# Patient Record
Sex: FEMALE | Race: WHITE | Employment: UNEMPLOYED | URBAN - NONMETROPOLITAN AREA
[De-identification: names, ages, dates, MRNs, and addresses within clinical notes are randomized per-mention and may not be internally consistent; named-entity substitution may affect disease eponyms.]

---

## 2018-06-25 ENCOUNTER — HOSPITAL ENCOUNTER (OUTPATIENT)
Age: 28
Discharge: HOME | End: 2018-06-25
Payer: COMMERCIAL

## 2018-06-25 LAB — HCG,BETA SUBUNIT,QUAL,SERUM: ABNORMAL MIU/ML (ref 0–5)

## 2018-06-25 PROCEDURE — 84702 CHORIONIC GONADOTROPIN TEST: CPT

## 2018-06-25 PROCEDURE — 36415 COLL VENOUS BLD VENIPUNCTURE: CPT

## 2018-06-27 ENCOUNTER — HOSPITAL ENCOUNTER (OUTPATIENT)
Age: 28
Discharge: HOME | End: 2018-06-27
Payer: COMMERCIAL

## 2018-06-27 LAB — HCG,BETA SUBUNIT,QUAL,SERUM: ABNORMAL MIU/ML (ref 0–5)

## 2018-06-27 PROCEDURE — 36415 COLL VENOUS BLD VENIPUNCTURE: CPT

## 2018-06-27 PROCEDURE — 84702 CHORIONIC GONADOTROPIN TEST: CPT

## 2018-07-02 ENCOUNTER — HOSPITAL ENCOUNTER (OUTPATIENT)
Age: 28
Discharge: HOME | End: 2018-07-02
Payer: COMMERCIAL

## 2018-07-02 LAB
BASOPHILS # BLD: 0.3 %
BASOPHILS ABSOLUTE: 0 THOU/MM3 (ref 0–0.1)
EOSINOPHIL # BLD: 1 %
EOSINOPHILS ABSOLUTE: 0.1 THOU/MM3 (ref 0–0.4)
ERYTHROCYTE [DISTWIDTH] IN BLOOD BY AUTOMATED COUNT: 11.9 % (ref 11.5–14.5)
ERYTHROCYTE [DISTWIDTH] IN BLOOD BY AUTOMATED COUNT: 38.5 FL (ref 35–45)
HCT VFR BLD CALC: 36.5 % (ref 37–47)
HEMOGLOBIN: 12.4 GM/DL (ref 12–16)
IMMATURE GRANS (ABS): 0.02 THOU/MM3 (ref 0–0.07)
IMMATURE GRANULOCYTES: 0.3 %
LYMPHOCYTES # BLD: 37.6 %
LYMPHOCYTES ABSOLUTE: 2.3 THOU/MM3 (ref 1–4.8)
MCH RBC QN AUTO: 30.6 PG (ref 26–33)
MCHC RBC AUTO-ENTMCNC: 34 GM/DL (ref 32.2–35.5)
MCV RBC AUTO: 90.1 FL (ref 81–99)
MONOCYTES # BLD: 8.7 %
MONOCYTES ABSOLUTE: 0.5 THOU/MM3 (ref 0.4–1.3)
NUCLEATED RED BLOOD CELLS: 0 /100 WBC
PLATELET # BLD: 256 THOU/MM3 (ref 130–400)
PMV BLD AUTO: 9.2 FL (ref 9.4–12.4)
RBC # BLD: 4.05 MILL/MM3 (ref 4.2–5.4)
SEG NEUTROPHILS: 52.1 %
SEGMENTED NEUTROPHILS ABSOLUTE COUNT: 3.2 THOU/MM3 (ref 1.8–7.7)
WBC # BLD: 6.1 THOU/MM3 (ref 4.8–10.8)

## 2018-07-02 PROCEDURE — 86900 BLOOD TYPING SEROLOGIC ABO: CPT

## 2018-07-02 PROCEDURE — 85025 COMPLETE CBC W/AUTO DIFF WBC: CPT

## 2018-07-02 PROCEDURE — 36415 COLL VENOUS BLD VENIPUNCTURE: CPT

## 2018-07-02 PROCEDURE — 86901 BLOOD TYPING SEROLOGIC RH(D): CPT

## 2018-07-02 PROCEDURE — 86850 RBC ANTIBODY SCREEN: CPT

## 2018-07-03 ENCOUNTER — HOSPITAL ENCOUNTER (OUTPATIENT)
Age: 28
Setting detail: OUTPATIENT SURGERY
Discharge: HOME | End: 2018-07-03
Attending: OBSTETRICS & GYNECOLOGY | Admitting: OBSTETRICS & GYNECOLOGY
Payer: COMMERCIAL

## 2018-07-03 ENCOUNTER — ANESTHESIA (OUTPATIENT)
Dept: OPERATING ROOM | Age: 28
End: 2018-07-03
Payer: COMMERCIAL

## 2018-07-03 ENCOUNTER — ANESTHESIA EVENT (OUTPATIENT)
Dept: OPERATING ROOM | Age: 28
End: 2018-07-03
Payer: COMMERCIAL

## 2018-07-03 VITALS
DIASTOLIC BLOOD PRESSURE: 64 MMHG | TEMPERATURE: 97 F | WEIGHT: 168 LBS | OXYGEN SATURATION: 99 % | BODY MASS INDEX: 24.05 KG/M2 | RESPIRATION RATE: 15 BRPM | HEART RATE: 68 BPM | HEIGHT: 70 IN | SYSTOLIC BLOOD PRESSURE: 105 MMHG

## 2018-07-03 VITALS
DIASTOLIC BLOOD PRESSURE: 50 MMHG | TEMPERATURE: 98.2 F | OXYGEN SATURATION: 99 % | RESPIRATION RATE: 5 BRPM | SYSTOLIC BLOOD PRESSURE: 89 MMHG

## 2018-07-03 DIAGNOSIS — Z98.890 S/P D&C (STATUS POST DILATION AND CURETTAGE): Primary | ICD-10-CM

## 2018-07-03 LAB
ABO: NORMAL
ANTIBODY SCREEN: NORMAL
RH FACTOR: NORMAL

## 2018-07-03 PROCEDURE — 7100000000 HC PACU RECOVERY - FIRST 15 MIN: Performed by: OBSTETRICS & GYNECOLOGY

## 2018-07-03 PROCEDURE — 7100000001 HC PACU RECOVERY - ADDTL 15 MIN: Performed by: OBSTETRICS & GYNECOLOGY

## 2018-07-03 PROCEDURE — 6370000000 HC RX 637 (ALT 250 FOR IP): Performed by: OBSTETRICS & GYNECOLOGY

## 2018-07-03 PROCEDURE — 6360000002 HC RX W HCPCS: Performed by: ANESTHESIOLOGY

## 2018-07-03 PROCEDURE — 6370000000 HC RX 637 (ALT 250 FOR IP)

## 2018-07-03 PROCEDURE — 2500000003 HC RX 250 WO HCPCS: Performed by: OBSTETRICS & GYNECOLOGY

## 2018-07-03 PROCEDURE — 3600000002 HC SURGERY LEVEL 2 BASE: Performed by: OBSTETRICS & GYNECOLOGY

## 2018-07-03 PROCEDURE — 88305 TISSUE EXAM BY PATHOLOGIST: CPT

## 2018-07-03 PROCEDURE — 2580000003 HC RX 258: Performed by: OBSTETRICS & GYNECOLOGY

## 2018-07-03 PROCEDURE — 3700000001 HC ADD 15 MINUTES (ANESTHESIA): Performed by: OBSTETRICS & GYNECOLOGY

## 2018-07-03 PROCEDURE — 2500000003 HC RX 250 WO HCPCS: Performed by: NURSE ANESTHETIST, CERTIFIED REGISTERED

## 2018-07-03 PROCEDURE — 3700000000 HC ANESTHESIA ATTENDED CARE: Performed by: OBSTETRICS & GYNECOLOGY

## 2018-07-03 PROCEDURE — 3600000012 HC SURGERY LEVEL 2 ADDTL 15MIN: Performed by: OBSTETRICS & GYNECOLOGY

## 2018-07-03 PROCEDURE — 6360000002 HC RX W HCPCS: Performed by: NURSE ANESTHETIST, CERTIFIED REGISTERED

## 2018-07-03 PROCEDURE — 7100000010 HC PHASE II RECOVERY - FIRST 15 MIN: Performed by: OBSTETRICS & GYNECOLOGY

## 2018-07-03 PROCEDURE — 7100000011 HC PHASE II RECOVERY - ADDTL 15 MIN: Performed by: OBSTETRICS & GYNECOLOGY

## 2018-07-03 PROCEDURE — 2580000003 HC RX 258: Performed by: NURSE ANESTHETIST, CERTIFIED REGISTERED

## 2018-07-03 RX ORDER — METHYLERGONOVINE MALEATE 0.2 MG/ML
INJECTION INTRAVENOUS PRN
Status: DISCONTINUED | OUTPATIENT
Start: 2018-07-03 | End: 2018-07-03 | Stop reason: SDUPTHER

## 2018-07-03 RX ORDER — SODIUM CHLORIDE 9 MG/ML
INJECTION, SOLUTION INTRAVENOUS CONTINUOUS PRN
Status: DISCONTINUED | OUTPATIENT
Start: 2018-07-03 | End: 2018-07-03 | Stop reason: SDUPTHER

## 2018-07-03 RX ORDER — SODIUM CHLORIDE 9 MG/ML
INJECTION, SOLUTION INTRAVENOUS CONTINUOUS
Status: DISCONTINUED | OUTPATIENT
Start: 2018-07-03 | End: 2018-07-03 | Stop reason: HOSPADM

## 2018-07-03 RX ORDER — FENTANYL CITRATE 50 UG/ML
INJECTION, SOLUTION INTRAMUSCULAR; INTRAVENOUS PRN
Status: DISCONTINUED | OUTPATIENT
Start: 2018-07-03 | End: 2018-07-03 | Stop reason: SDUPTHER

## 2018-07-03 RX ORDER — DEXAMETHASONE SODIUM PHOSPHATE 4 MG/ML
INJECTION, SOLUTION INTRA-ARTICULAR; INTRALESIONAL; INTRAMUSCULAR; INTRAVENOUS; SOFT TISSUE PRN
Status: DISCONTINUED | OUTPATIENT
Start: 2018-07-03 | End: 2018-07-03 | Stop reason: SDUPTHER

## 2018-07-03 RX ORDER — KETOROLAC TROMETHAMINE 30 MG/ML
INJECTION, SOLUTION INTRAMUSCULAR; INTRAVENOUS PRN
Status: DISCONTINUED | OUTPATIENT
Start: 2018-07-03 | End: 2018-07-03

## 2018-07-03 RX ORDER — HYDROCODONE BITARTRATE AND ACETAMINOPHEN 5; 325 MG/1; MG/1
1 TABLET ORAL ONCE
Status: COMPLETED | OUTPATIENT
Start: 2018-07-03 | End: 2018-07-03

## 2018-07-03 RX ORDER — ONDANSETRON 2 MG/ML
INJECTION INTRAMUSCULAR; INTRAVENOUS PRN
Status: DISCONTINUED | OUTPATIENT
Start: 2018-07-03 | End: 2018-07-03 | Stop reason: SDUPTHER

## 2018-07-03 RX ORDER — FENTANYL CITRATE 50 UG/ML
50 INJECTION, SOLUTION INTRAMUSCULAR; INTRAVENOUS EVERY 5 MIN PRN
Status: DISCONTINUED | OUTPATIENT
Start: 2018-07-03 | End: 2018-07-03 | Stop reason: HOSPADM

## 2018-07-03 RX ORDER — HYDROCODONE BITARTRATE AND ACETAMINOPHEN 5; 325 MG/1; MG/1
TABLET ORAL
Status: DISCONTINUED
Start: 2018-07-03 | End: 2018-07-03 | Stop reason: HOSPADM

## 2018-07-03 RX ORDER — SODIUM CHLORIDE 0.9 % (FLUSH) 0.9 %
10 SYRINGE (ML) INJECTION EVERY 12 HOURS SCHEDULED
Status: DISCONTINUED | OUTPATIENT
Start: 2018-07-03 | End: 2018-07-03 | Stop reason: HOSPADM

## 2018-07-03 RX ORDER — SCOLOPAMINE TRANSDERMAL SYSTEM 1 MG/1
1 PATCH, EXTENDED RELEASE TRANSDERMAL ONCE
Status: DISCONTINUED | OUTPATIENT
Start: 2018-07-03 | End: 2018-07-03 | Stop reason: HOSPADM

## 2018-07-03 RX ORDER — MORPHINE SULFATE 2 MG/ML
2 INJECTION, SOLUTION INTRAMUSCULAR; INTRAVENOUS EVERY 5 MIN PRN
Status: DISCONTINUED | OUTPATIENT
Start: 2018-07-03 | End: 2018-07-03 | Stop reason: HOSPADM

## 2018-07-03 RX ORDER — LIDOCAINE HYDROCHLORIDE 20 MG/ML
INJECTION, SOLUTION INFILTRATION; PERINEURAL PRN
Status: DISCONTINUED | OUTPATIENT
Start: 2018-07-03 | End: 2018-07-03 | Stop reason: SDUPTHER

## 2018-07-03 RX ORDER — HYDROCODONE BITARTRATE AND ACETAMINOPHEN 5; 325 MG/1; MG/1
1 TABLET ORAL EVERY 6 HOURS PRN
Qty: 10 TABLET | Refills: 0 | Status: SHIPPED | OUTPATIENT
Start: 2018-07-03 | End: 2018-07-06

## 2018-07-03 RX ORDER — SCOLOPAMINE TRANSDERMAL SYSTEM 1 MG/1
PATCH, EXTENDED RELEASE TRANSDERMAL
Status: DISCONTINUED
Start: 2018-07-03 | End: 2018-07-03 | Stop reason: HOSPADM

## 2018-07-03 RX ORDER — MISOPROSTOL 200 UG/1
TABLET ORAL PRN
Status: DISCONTINUED | OUTPATIENT
Start: 2018-07-03 | End: 2018-07-03 | Stop reason: HOSPADM

## 2018-07-03 RX ORDER — PROPOFOL 10 MG/ML
INJECTION, EMULSION INTRAVENOUS PRN
Status: DISCONTINUED | OUTPATIENT
Start: 2018-07-03 | End: 2018-07-03 | Stop reason: SDUPTHER

## 2018-07-03 RX ORDER — MIDAZOLAM HYDROCHLORIDE 1 MG/ML
INJECTION INTRAMUSCULAR; INTRAVENOUS PRN
Status: DISCONTINUED | OUTPATIENT
Start: 2018-07-03 | End: 2018-07-03 | Stop reason: SDUPTHER

## 2018-07-03 RX ORDER — SODIUM CHLORIDE 0.9 % (FLUSH) 0.9 %
10 SYRINGE (ML) INJECTION PRN
Status: DISCONTINUED | OUTPATIENT
Start: 2018-07-03 | End: 2018-07-03 | Stop reason: HOSPADM

## 2018-07-03 RX ADMIN — SODIUM CHLORIDE: 9 INJECTION, SOLUTION INTRAVENOUS at 12:13

## 2018-07-03 RX ADMIN — FENTANYL CITRATE 50 MCG: 50 INJECTION INTRAMUSCULAR; INTRAVENOUS at 12:22

## 2018-07-03 RX ADMIN — DEXAMETHASONE SODIUM PHOSPHATE 8 MG: 4 INJECTION, SOLUTION INTRAMUSCULAR; INTRAVENOUS at 12:21

## 2018-07-03 RX ADMIN — ONDANSETRON HYDROCHLORIDE 4 MG: 4 INJECTION, SOLUTION INTRAMUSCULAR; INTRAVENOUS at 12:21

## 2018-07-03 RX ADMIN — METHYLERGONOVINE MALEATE 200 MCG: 0.2 INJECTION, SOLUTION INTRAMUSCULAR; INTRAVENOUS at 12:37

## 2018-07-03 RX ADMIN — HYDROCODONE BITARTRATE AND ACETAMINOPHEN 1 TABLET: 5; 325 TABLET ORAL at 14:06

## 2018-07-03 RX ADMIN — FENTANYL CITRATE 25 MCG: 50 INJECTION INTRAMUSCULAR; INTRAVENOUS at 12:44

## 2018-07-03 RX ADMIN — FENTANYL CITRATE 25 MCG: 50 INJECTION INTRAMUSCULAR; INTRAVENOUS at 12:27

## 2018-07-03 RX ADMIN — PROPOFOL 150 MG: 10 INJECTION, EMULSION INTRAVENOUS at 12:17

## 2018-07-03 RX ADMIN — PROPOFOL 50 MG: 10 INJECTION, EMULSION INTRAVENOUS at 12:21

## 2018-07-03 RX ADMIN — MIDAZOLAM HYDROCHLORIDE 2 MG: 1 INJECTION, SOLUTION INTRAMUSCULAR; INTRAVENOUS at 12:15

## 2018-07-03 RX ADMIN — DOXYCYCLINE 100 MG: 100 INJECTION, POWDER, LYOPHILIZED, FOR SOLUTION INTRAVENOUS at 11:43

## 2018-07-03 RX ADMIN — FENTANYL CITRATE 50 MCG: 50 INJECTION, SOLUTION INTRAMUSCULAR; INTRAVENOUS at 13:16

## 2018-07-03 RX ADMIN — LIDOCAINE HYDROCHLORIDE 100 MG: 20 INJECTION, SOLUTION INFILTRATION; PERINEURAL at 12:17

## 2018-07-03 ASSESSMENT — PULMONARY FUNCTION TESTS
PIF_VALUE: 5
PIF_VALUE: 0
PIF_VALUE: 15
PIF_VALUE: 4
PIF_VALUE: 3
PIF_VALUE: 3
PIF_VALUE: 4
PIF_VALUE: 3
PIF_VALUE: 0
PIF_VALUE: 3
PIF_VALUE: 8
PIF_VALUE: 3
PIF_VALUE: 0
PIF_VALUE: 10
PIF_VALUE: 4
PIF_VALUE: 5
PIF_VALUE: 3
PIF_VALUE: 3
PIF_VALUE: 2
PIF_VALUE: 2
PIF_VALUE: 4
PIF_VALUE: 2
PIF_VALUE: 3
PIF_VALUE: 0
PIF_VALUE: 5
PIF_VALUE: 3
PIF_VALUE: 5
PIF_VALUE: 17
PIF_VALUE: 3
PIF_VALUE: 0
PIF_VALUE: 4
PIF_VALUE: 5
PIF_VALUE: 0
PIF_VALUE: 2
PIF_VALUE: 3
PIF_VALUE: 10
PIF_VALUE: 3
PIF_VALUE: 6
PIF_VALUE: 7

## 2018-07-03 ASSESSMENT — PAIN SCALES - GENERAL
PAINLEVEL_OUTOF10: 4
PAINLEVEL_OUTOF10: 7

## 2018-07-03 ASSESSMENT — PAIN - FUNCTIONAL ASSESSMENT: PAIN_FUNCTIONAL_ASSESSMENT: 0-10

## 2018-07-03 NOTE — PROGRESS NOTES
1304 Pt to phase 1 recovery per cart. Pt non responsive to verbal or tactile stimulation. Oral airway in place and jaw thrust maintained by CRNA. POx 96% on room air. Resp easy. Color pink. Skin warm and dry. Christa pad in place - no drainage noted. 1308 Pt awakening - oral airway removed. Resp easy POx 97% on room air. 1315 Pt awake. Talking with staff. Denies nausea. C/o \"cramping - 7\" on scale. 1316 Fentanyl 50 mcg IV given for cramping. 1327 Pt fully awake - taking offered ice chips. 1330 Pt states pain \"3-4\". 1337 Pt alert and stable. Pain remain at \"3-4\" on scale. 1340To phase 2 recovery per cart. 1345 Pt taking offered snack. Family at bedside. 1406 Pt denies nausea. States pain \"3-4\" Norco 1 tab given for pain as ordered. 1425 Discharge instructions reviewed with pt and family. All voice understanding. Denies nausea. States pain is minimal.  1430 Pt dressing with mother at bedside. 56 Pt assisted to bathroom. Voided. Christa pad changed. Moderate amt bloody drainage on old pad. Pt cleansed and new pad applied. Pt denies dizziness when up. 1440 Pt discharged per wheelchair to car -  driving. Pt alert and stable.

## 2018-07-03 NOTE — ANESTHESIA POSTPROCEDURE EVALUATION
Department of Anesthesiology  Postprocedure Note    Patient: Lincoln Wade  MRN: [id number]  YOB: 1990  Date of evaluation: 7/3/2018  Time:  1:24 PM     Procedure Summary     Date:  07/03/18 Room / Location:  [facility] OR 01 / [address] [facility]    Anesthesia Start:  0614 Anesthesia Stop:  3687    Procedure:  DILATATION AND CURETTAGE SUCTION (N/A ) Diagnosis:  (BLIGHTED OVUM)    Surgeon:  Samreen Carl MD Responsible Provider:  Sarah Arellano MD    Anesthesia Type:  general ASA Status:  2          Anesthesia Type: general    Mk Phase I:      Mk Phase II:      Last vitals: Reviewed and per EMR flowsheets.        Anesthesia Post Evaluation    Patient location during evaluation: PACU  Patient participation: complete - patient participated  Level of consciousness: awake  Airway patency: patent  Nausea & Vomiting: no vomiting and no nausea  Complications: no  Cardiovascular status: hemodynamically stable  Respiratory status: acceptable  Hydration status: stable

## 2018-07-03 NOTE — DISCHARGE SUMMARY
GYN Surgery  Discharge Summary     Patient ID:  Adrian Meckel  [id number]  28 y.o.  1990    Admit date: 7/3/2018    Admitting Physician: Brent Marquez    Discharge Diagnoses: BLIGHTED OVUM    Discharged Condition: good      Procedures Performed: [facility]     Hospital Course: Patient was admitted on the day of surgery, and underwent an uncomplicated procedure. See dictated op note. Disposition: home    Patient Instructions: Activity: activity as tolerated, no sex for 2 weeks and no driving while on analgesics  Diet: regular  Wound Care: as directed    Discharge Medication:    oYli Hamilton   Home Medication Instructions VTR:[id number]    Printed on:07/03/18 8778   Medication Information                      HYDROcodone-acetaminophen (NORCO) 5-325 MG per tablet  Take 1 tablet by mouth every 6 hours as needed for Pain for up to 3 days. Intended supply: 3 days. Take lowest dose possible to manage pain.              Prenatal MV-Min-Fe Fum-FA-DHA (PRENATAL 1 PO)  Take 1 tablet by mouth                  Discharge Date: 7/3/18    Follow-up with Brent Marquez in 1 week    Signed:  Electronically signed by Brent Marquez MD on 7/3/2018 at 1:02 PM

## 2018-07-03 NOTE — H&P
[facility]  History and Physical Update    Pt Name: Ej Salazar  MRN: [id number]  YOB: 1990  Date of evaluation: 7/3/2018    [x] I have examined the patient and reviewed the H&P/Consult and there are no changes to the patient or plans.     [] I have examined the patient and reviewed the H&P/Consult and have noted the following changes:     33  with blighted ovum for suction D&C       Haze Awilda  Electronically signed 7/3/2018 at 7:16 AM

## 2018-07-03 NOTE — ANESTHESIA PRE PROCEDURE
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 BP Readings from Last 3 Encounters:   12/07/16 122/60   09/18/16 126/83   05/04/16 110/80       NPO Status: Time of last liquid consumption: 2200                        Time of last solid consumption: 2200                        Date of last liquid consumption: 07/02/18                        Date of last solid food consumption: 07/02/18    BMI:   Wt Readings from Last 3 Encounters:   12/07/16 168 lb 12.8 oz (76.6 kg)   09/18/16 159 lb 9.6 oz (72.4 kg)   05/04/16 160 lb (72.6 kg)     There is no height or weight on file to calculate BMI.    CBC:   Lab Results   Component Value Date    WBC 6.1 07/02/2018    RBC 4.05 07/02/2018    HGB 12.4 07/02/2018    HCT 36.5 07/02/2018    MCV 90.1 07/02/2018    RDW 12.5 01/05/2013     07/02/2018       CMP:   Lab Results   Component Value Date     01/05/2013    K 4.3 01/05/2013     01/05/2013    CO2 26 01/05/2013    BUN 10 01/05/2013    CREATININE 0.7 01/05/2013    LABGLOM >90 01/06/2013    GLUCOSE 98 01/05/2013    CALCIUM 9.4 01/05/2013       POC Tests: No results for input(s): POCGLU, POCNA, POCK, POCCL, POCBUN, POCHEMO, POCHCT in the last 72 hours.     Coags: No results found for: PROTIME, INR, APTT    HCG (If Applicable):   Lab Results   Component Value Date    PREGTESTUR NEGATIVE 05/07/2012    PREGSERUM

## 2018-09-06 ENCOUNTER — NURSE TRIAGE (OUTPATIENT)
Dept: ADMINISTRATIVE | Age: 28
End: 2018-09-06

## 2018-09-07 NOTE — TELEPHONE ENCOUNTER
Reason for Disposition   SPOTTING after sexual intercourse (single or brief episode)    Answer Assessment - Initial Assessment Questions  1. SYMPTOM: \"What's the main symptom you're concerned about? \" (e.g., pain, itching, dryness)      Spotting during intercourse. 2. LOCATION: \"Where is the  _______ located? \" (e.g., inside/outside, left/right)    n/a  3. ONSET: \"When did the  ________  start? \"      Tonight. 4. PAIN: \"Is there any pain? \" If so, ask: \"How bad is it? \" (Scale: 1-10; mild, moderate, severe)      No pain   5. ITCHING: \"Is there any itching? \" If so, ask: \"How bad is it? \" (Scale: 1-10; mild, moderate, severe)      No   6. CAUSE: \"What do you think is causing the symptoms? \"      [place]. 7. OTHER SYMPTOMS: \"Do you have any other symptoms? \" (e.g., vaginal bleeding, pain with urination)      No   8. PREGNANCY: \"Is there any chance you are pregnant? \" \"When was your last menstrual period? \"      Pregnant and not has seen OB GYN yet. 7. CAUSE: \"What do you think is causing the discharge? \" \"Have you had the same problem before? What happened then? \"      No.   8. OTHER SYMPTOMS: \"Do you have any other symptoms? \" (e.g., fever, itching, vaginal bleeding, pain with urination, injury to genital area, vaginal foreign body)  No    Answer Assessment - Initial Assessment Questions  1. ONSET: \"When did this bleeding start? \"        During intercourse. 2. DESCRIPTION: \"Describe the bleeding that you are having. \" \"How much bleeding is there? \"     - SPOTTING: spotting, or pinkish / brownish mucous discharge; does not fill panti-liner or pad     - MILD:  less than 1 pad / hour; less than patient's usual menstrual bleeding    - MODERATE: 1-2 pads / hour; small-medium blood clots (e.g., pea, grape, small coin)     - SEVERE: soaking 2 or more pads/hour for 2 or more hours; bleeding not contained by pads or continuous red blood from vagina; large blood clots (e.g., golf ball, large coin)       Spotting.    3. ABDOMINAL PAIN SEVERITY: If present, ask: \"How bad is it? \"  (e.g., Scale 1-10; mild, moderate, or severe)    - MILD (1-3): doesn't interfere with normal activities, abdomen soft and not tender to touch     - MODERATE (4-7): interferes with normal activities or awakens from sleep, tender to touch     - SEVERE (8-10): excruciating pain, doubled over, unable to do any normal activities      No pain   4. PREGNANCY: \"Do you know how many weeks or months pregnant you are?\" \"When was the first day of your last normal menstrual period? \"      Just found out she is pregnant. Has not seen OB GYN yet. 5. HEMODYNAMIC STATUS: \"Are you weak or feeling lightheaded? \" If so, ask: \"Can you stand and walk normally? \"       No   6. OTHER SYMPTOMS: \"What other symptoms are you having with the bleeding? \" (e.g., passed tissue, vaginal discharge, fever, menstrual-type cramps)     No    Protocols used: PREGNANCY - VAGINAL BLEEDING LESS THAN 20 WEEKS EGA-ADULT-AH, VAGINAL [facility]

## 2018-10-13 ENCOUNTER — HOSPITAL ENCOUNTER (OUTPATIENT)
Age: 28
Discharge: HOME | End: 2018-10-13
Payer: COMMERCIAL

## 2018-10-13 LAB
ABO: NORMAL
ANTIBODY SCREEN: NORMAL
ERYTHROCYTE [DISTWIDTH] IN BLOOD BY AUTOMATED COUNT: 11.8 % (ref 11.5–14.5)
ERYTHROCYTE [DISTWIDTH] IN BLOOD BY AUTOMATED COUNT: 37.8 FL (ref 35–45)
HCT VFR BLD CALC: 38.4 % (ref 37–47)
HEMOGLOBIN: 13.1 GM/DL (ref 12–16)
HEPATITIS B SURFACE ANTIGEN: NEGATIVE
MCH RBC QN AUTO: 30.4 PG (ref 26–33)
MCHC RBC AUTO-ENTMCNC: 34.1 GM/DL (ref 32.2–35.5)
MCV RBC AUTO: 89.1 FL (ref 81–99)
PLATELET # BLD: 257 THOU/MM3 (ref 130–400)
PMV BLD AUTO: 9.2 FL (ref 9.4–12.4)
RBC # BLD: 4.31 MILL/MM3 (ref 4.2–5.4)
RH FACTOR: NORMAL
RPR: NONREACTIVE
TSH SERPL DL<=0.05 MIU/L-ACNC: 1.81 UIU/ML (ref 0.4–4.2)
WBC # BLD: 6.3 THOU/MM3 (ref 4.8–10.8)

## 2018-10-13 PROCEDURE — 85027 COMPLETE CBC AUTOMATED: CPT

## 2018-10-13 PROCEDURE — 86762 RUBELLA ANTIBODY: CPT

## 2018-10-13 PROCEDURE — 87340 HEPATITIS B SURFACE AG IA: CPT

## 2018-10-13 PROCEDURE — 84443 ASSAY THYROID STIM HORMONE: CPT

## 2018-10-13 PROCEDURE — 86901 BLOOD TYPING SEROLOGIC RH(D): CPT

## 2018-10-13 PROCEDURE — 86850 RBC ANTIBODY SCREEN: CPT

## 2018-10-13 PROCEDURE — 86592 SYPHILIS TEST NON-TREP QUAL: CPT

## 2018-10-13 PROCEDURE — 36415 COLL VENOUS BLD VENIPUNCTURE: CPT

## 2018-10-13 PROCEDURE — 87086 URINE CULTURE/COLONY COUNT: CPT

## 2018-10-13 PROCEDURE — 87389 HIV-1 AG W/HIV-1&-2 AB AG IA: CPT

## 2018-10-13 PROCEDURE — 86900 BLOOD TYPING SEROLOGIC ABO: CPT

## 2018-10-14 LAB
ORGANISM: ABNORMAL
URINE CULTURE, ROUTINE: ABNORMAL

## 2018-10-17 LAB — HIV-2 AB: NEGATIVE

## 2018-10-19 LAB — RUBELLA: 45.9 IU/ML

## 2019-02-01 ENCOUNTER — HOSPITAL ENCOUNTER (EMERGENCY)
Age: 29
Discharge: HOME | End: 2019-02-01
Attending: NURSE PRACTITIONER
Payer: COMMERCIAL

## 2019-02-01 VITALS
RESPIRATION RATE: 18 BRPM | OXYGEN SATURATION: 100 % | HEART RATE: 105 BPM | TEMPERATURE: 98.3 F | DIASTOLIC BLOOD PRESSURE: 64 MMHG | SYSTOLIC BLOOD PRESSURE: 135 MMHG

## 2019-02-01 DIAGNOSIS — R31.9 HEMATURIA, UNSPECIFIED TYPE: Primary | ICD-10-CM

## 2019-02-01 DIAGNOSIS — Z34.90 INTRAUTERINE PREGNANCY: ICD-10-CM

## 2019-02-01 LAB
BILIRUBIN URINE: NEGATIVE
BLOOD, URINE: ABNORMAL
CHARACTER, URINE: CLEAR
COLOR: YELLOW
GLUCOSE, URINE: NEGATIVE MG/DL
KETONES, URINE: NEGATIVE
LEUKOCYTES, UA: ABNORMAL
NITRITE, URINE: NEGATIVE
PH UA: 7 (ref 5–9)
PROTEIN UA: NEGATIVE MG/DL
REFLEX TO URINE C & S: ABNORMAL
SPECIFIC GRAVITY UA: 1.01 (ref 1–1.03)
UROBILINOGEN, URINE: 0.2 EU/DL (ref 0–1)

## 2019-02-01 PROCEDURE — 99213 OFFICE O/P EST LOW 20 MIN: CPT

## 2019-02-01 PROCEDURE — 81003 URINALYSIS AUTO W/O SCOPE: CPT

## 2019-02-01 PROCEDURE — 99213 OFFICE O/P EST LOW 20 MIN: CPT | Performed by: NURSE PRACTITIONER

## 2019-02-01 ASSESSMENT — ENCOUNTER SYMPTOMS
ABDOMINAL PAIN: 1
NAUSEA: 0

## 2019-05-03 ENCOUNTER — HOSPITAL ENCOUNTER (INPATIENT)
Age: 29
LOS: 3 days | Discharge: HOME | End: 2019-05-06
Attending: OBSTETRICS & GYNECOLOGY | Admitting: OBSTETRICS & GYNECOLOGY
Payer: COMMERCIAL

## 2019-05-03 ENCOUNTER — ANESTHESIA (OUTPATIENT)
Dept: LABOR AND DELIVERY | Age: 29
End: 2019-05-03
Payer: COMMERCIAL

## 2019-05-03 ENCOUNTER — ANESTHESIA EVENT (OUTPATIENT)
Dept: LABOR AND DELIVERY | Age: 29
End: 2019-05-03
Payer: COMMERCIAL

## 2019-05-03 LAB
ABO: NORMAL
AMNISURE PATIENT RESULT: NORMAL
AMPHETAMINE+METHAMPHETAMINE URINE SCREEN: NEGATIVE
ANTIBODY SCREEN: NORMAL
BARBITURATE QUANTITATIVE URINE: NEGATIVE
BENZODIAZEPINE QUANTITATIVE URINE: NEGATIVE
CANNABINOID QUANTITATIVE URINE: NEGATIVE
COCAINE METABOLITE QUANTITATIVE URINE: NEGATIVE
ERYTHROCYTE [DISTWIDTH] IN BLOOD BY AUTOMATED COUNT: 13 % (ref 11.5–14.5)
ERYTHROCYTE [DISTWIDTH] IN BLOOD BY AUTOMATED COUNT: 42.3 FL (ref 35–45)
HCT VFR BLD CALC: 36.7 % (ref 37–47)
HEMOGLOBIN: 12.2 GM/DL (ref 12–16)
MCH RBC QN AUTO: 29.8 PG (ref 26–33)
MCHC RBC AUTO-ENTMCNC: 33.2 GM/DL (ref 32.2–35.5)
MCV RBC AUTO: 89.7 FL (ref 81–99)
OPIATES, URINE: NEGATIVE
OXYCODONE: NEGATIVE
PHENCYCLIDINE QUANTITATIVE URINE: NEGATIVE
PLATELET # BLD: 224 THOU/MM3 (ref 130–400)
PMV BLD AUTO: 9.8 FL (ref 9.4–12.4)
RBC # BLD: 4.09 MILL/MM3 (ref 4.2–5.4)
RH FACTOR: NORMAL
RPR: NONREACTIVE
WBC # BLD: 8.3 THOU/MM3 (ref 4.8–10.8)

## 2019-05-03 PROCEDURE — 80305 DRUG TEST PRSMV DIR OPT OBS: CPT

## 2019-05-03 PROCEDURE — 3700000025 EPIDURAL BLOCK: Performed by: ANESTHESIOLOGY

## 2019-05-03 PROCEDURE — 36415 COLL VENOUS BLD VENIPUNCTURE: CPT

## 2019-05-03 PROCEDURE — 2580000003 HC RX 258: Performed by: OBSTETRICS & GYNECOLOGY

## 2019-05-03 PROCEDURE — 85027 COMPLETE CBC AUTOMATED: CPT

## 2019-05-03 PROCEDURE — 86592 SYPHILIS TEST NON-TREP QUAL: CPT

## 2019-05-03 PROCEDURE — 84112 EVAL AMNIOTIC FLUID PROTEIN: CPT

## 2019-05-03 PROCEDURE — 86900 BLOOD TYPING SEROLOGIC ABO: CPT

## 2019-05-03 PROCEDURE — 1220000001 HC SEMI PRIVATE L&D R&B

## 2019-05-03 PROCEDURE — 86901 BLOOD TYPING SEROLOGIC RH(D): CPT

## 2019-05-03 PROCEDURE — 2709999900 HC NON-CHARGEABLE SUPPLY

## 2019-05-03 PROCEDURE — 86850 RBC ANTIBODY SCREEN: CPT

## 2019-05-03 PROCEDURE — 2500000003 HC RX 250 WO HCPCS: Performed by: ANESTHESIOLOGY

## 2019-05-03 PROCEDURE — 6360000002 HC RX W HCPCS

## 2019-05-03 RX ORDER — IBUPROFEN 800 MG/1
800 TABLET ORAL EVERY 8 HOURS PRN
Status: DISCONTINUED | OUTPATIENT
Start: 2019-05-03 | End: 2019-05-04 | Stop reason: HOSPADM

## 2019-05-03 RX ORDER — BUTORPHANOL TARTRATE 1 MG/ML
1 INJECTION, SOLUTION INTRAMUSCULAR; INTRAVENOUS
Status: DISCONTINUED | OUTPATIENT
Start: 2019-05-03 | End: 2019-05-04 | Stop reason: HOSPADM

## 2019-05-03 RX ORDER — SODIUM CHLORIDE 0.9 % (FLUSH) 0.9 %
10 SYRINGE (ML) INJECTION EVERY 12 HOURS SCHEDULED
Status: DISCONTINUED | OUTPATIENT
Start: 2019-05-03 | End: 2019-05-04

## 2019-05-03 RX ORDER — MISOPROSTOL 200 UG/1
1000 TABLET ORAL PRN
Status: DISCONTINUED | OUTPATIENT
Start: 2019-05-03 | End: 2019-05-04 | Stop reason: HOSPADM

## 2019-05-03 RX ORDER — SODIUM CHLORIDE 0.9 % (FLUSH) 0.9 %
10 SYRINGE (ML) INJECTION PRN
Status: DISCONTINUED | OUTPATIENT
Start: 2019-05-03 | End: 2019-05-04 | Stop reason: HOSPADM

## 2019-05-03 RX ORDER — ONDANSETRON 2 MG/ML
8 INJECTION INTRAMUSCULAR; INTRAVENOUS EVERY 6 HOURS PRN
Status: DISCONTINUED | OUTPATIENT
Start: 2019-05-03 | End: 2019-05-04 | Stop reason: HOSPADM

## 2019-05-03 RX ORDER — ONDANSETRON 2 MG/ML
4 INJECTION INTRAMUSCULAR; INTRAVENOUS EVERY 6 HOURS PRN
Status: DISCONTINUED | OUTPATIENT
Start: 2019-05-03 | End: 2019-05-04 | Stop reason: HOSPADM

## 2019-05-03 RX ORDER — LIDOCAINE HYDROCHLORIDE 10 MG/ML
30 INJECTION, SOLUTION EPIDURAL; INFILTRATION; INTRACAUDAL; PERINEURAL PRN
Status: DISCONTINUED | OUTPATIENT
Start: 2019-05-03 | End: 2019-05-04 | Stop reason: HOSPADM

## 2019-05-03 RX ORDER — TERBUTALINE SULFATE 1 MG/ML
0.25 INJECTION, SOLUTION SUBCUTANEOUS ONCE
Status: DISCONTINUED | OUTPATIENT
Start: 2019-05-03 | End: 2019-05-04 | Stop reason: HOSPADM

## 2019-05-03 RX ORDER — SODIUM CHLORIDE, SODIUM LACTATE, POTASSIUM CHLORIDE, CALCIUM CHLORIDE 600; 310; 30; 20 MG/100ML; MG/100ML; MG/100ML; MG/100ML
INJECTION, SOLUTION INTRAVENOUS CONTINUOUS
Status: DISCONTINUED | OUTPATIENT
Start: 2019-05-03 | End: 2019-05-04

## 2019-05-03 RX ORDER — EPHEDRINE SULFATE 50 MG/ML
5 INJECTION, SOLUTION INTRAVENOUS PRN
Status: DISCONTINUED | OUTPATIENT
Start: 2019-05-03 | End: 2019-05-04

## 2019-05-03 RX ORDER — CARBOPROST TROMETHAMINE 250 UG/ML
250 INJECTION, SOLUTION INTRAMUSCULAR PRN
Status: DISCONTINUED | OUTPATIENT
Start: 2019-05-03 | End: 2019-05-04 | Stop reason: HOSPADM

## 2019-05-03 RX ORDER — DIPHENHYDRAMINE HYDROCHLORIDE 50 MG/ML
25 INJECTION INTRAMUSCULAR; INTRAVENOUS EVERY 4 HOURS PRN
Status: DISCONTINUED | OUTPATIENT
Start: 2019-05-03 | End: 2019-05-04 | Stop reason: HOSPADM

## 2019-05-03 RX ORDER — METHYLERGONOVINE MALEATE 0.2 MG/ML
200 INJECTION INTRAVENOUS PRN
Status: DISCONTINUED | OUTPATIENT
Start: 2019-05-03 | End: 2019-05-04 | Stop reason: HOSPADM

## 2019-05-03 RX ORDER — ACETAMINOPHEN 325 MG/1
650 TABLET ORAL EVERY 4 HOURS PRN
Status: DISCONTINUED | OUTPATIENT
Start: 2019-05-03 | End: 2019-05-04 | Stop reason: HOSPADM

## 2019-05-03 RX ORDER — NALBUPHINE HCL 10 MG/ML
5 AMPUL (ML) INJECTION EVERY 4 HOURS PRN
Status: DISCONTINUED | OUTPATIENT
Start: 2019-05-03 | End: 2019-05-04 | Stop reason: HOSPADM

## 2019-05-03 RX ORDER — NALOXONE HYDROCHLORIDE 0.4 MG/ML
0.4 INJECTION, SOLUTION INTRAMUSCULAR; INTRAVENOUS; SUBCUTANEOUS PRN
Status: DISCONTINUED | OUTPATIENT
Start: 2019-05-03 | End: 2019-05-04 | Stop reason: HOSPADM

## 2019-05-03 RX ORDER — SEVOFLURANE 250 ML/250ML
1 LIQUID RESPIRATORY (INHALATION) CONTINUOUS PRN
Status: DISCONTINUED | OUTPATIENT
Start: 2019-05-03 | End: 2019-05-04 | Stop reason: HOSPADM

## 2019-05-03 RX ADMIN — SODIUM CHLORIDE, POTASSIUM CHLORIDE, SODIUM LACTATE AND CALCIUM CHLORIDE: 600; 310; 30; 20 INJECTION, SOLUTION INTRAVENOUS at 20:09

## 2019-05-03 RX ADMIN — SODIUM CHLORIDE, POTASSIUM CHLORIDE, SODIUM LACTATE AND CALCIUM CHLORIDE: 600; 310; 30; 20 INJECTION, SOLUTION INTRAVENOUS at 13:53

## 2019-05-03 RX ADMIN — Medication 1 MILLI-UNITS/MIN: at 09:18

## 2019-05-03 RX ADMIN — Medication 16 ML/HR: at 20:05

## 2019-05-03 RX ADMIN — SODIUM CHLORIDE, POTASSIUM CHLORIDE, SODIUM LACTATE AND CALCIUM CHLORIDE: 600; 310; 30; 20 INJECTION, SOLUTION INTRAVENOUS at 08:50

## 2019-05-03 NOTE — ANESTHESIA PRE PROCEDURE
Department of Anesthesiology  Preprocedure Note       Name:  Ezio Clements   Age:  33 y.o.  :  1990                                          MRN:  [id number]         Date:  5/3/2019      Surgeon: * No surgeons listed *    Procedure: ANESTHESIA LABOR ANALGESIA    Medications prior to admission:   Prior to Admission medications    Medication Sig Start Date End Date Taking?  Authorizing Provider   Prenatal MV-Min-Fe Fum-FA-DHA (PRENATAL 1 PO) Take 1 tablet by mouth   Yes Historical Provider, MD       Current medications:    Current Facility-Administered Medications   Medication Dose Route Frequency Provider Last Rate Last Dose    lactated ringers infusion   Intravenous Continuous Parish Montano  mL/hr at 19 1353      sodium chloride flush 0.9 % injection 10 mL  10 mL Intravenous 2 times per day Parish Montano MD        sodium chloride flush 0.9 % injection 10 mL  10 mL Intravenous PRN Parish Montano MD        lidocaine PF 1 % injection 30 mL  30 mL Other PRN Parish Montano MD        butorphanol (STADOL) injection 1 mg  1 mg Intravenous Q2H PRN Parish Montano MD        nitrous oxide 50% inhalation 1 each  1 each Inhalation Continuous PRN Parish Montano MD        acetaminophen (TYLENOL) tablet 650 mg  650 mg Oral Q4H PRN Parish Montano MD        ibuprofen (ADVIL;MOTRIN) tablet 800 mg  800 mg Oral Q8H PRN Parish Montano MD        oxytocin (PITOCIN) 30 units in 500 mL infusion  1 bhupinder-units/min Intravenous Continuous Parish Montano MD 16 mL/hr at 19 1830 16 bhupinder-units/min at 19 1830    diphenhydrAMINE (BENADRYL) injection 25 mg  25 mg Intravenous Q4H PRN Parish Montano MD        [facility]dansetron [facility]) injection 8 mg  8 mg Intravenous Q6H PRN Parish Montano MD        terbutaline (BRETHINE) injection 0.25 mg  0.25 mg Subcutaneous Once Parish Montano MD        oxytocin (PITOCIN) 30 units in 500 mL infusion  1 bhupinder-units/min Intravenous Continuous PRN Clau Repress Lillian Cox MD        methylergonovine (METHERGINE) injection 200 mcg  200 mcg Intramuscular PRN Hari Hernandez MD        carboprost [facility]) injection 250 mcg  250 mcg Intramuscular PRN Hari Hernandez MD        misoprostol (CYTOTEC) tablet 1,000 mcg  1,000 mcg Rectal PRN Hansford Grippe, MD        witch hazel-glycerin (TUCKS) pad   Topical PRN Hari Hernandez MD        benzocaine-menthol (DERMOPLAST) 20-0.5 % spray   Topical PRN Hari Hernandez MD           Allergies: Allergies   Allergen Reactions    Bactrim [Sulfamethoxazole-Trimethoprim] Shortness Of Breath and Itching       Problem List:    Patient Active Problem List   Diagnosis Code    OCD (obsessive compulsive disorder) F42.9    Recurrent cystitis N30.90    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    S/P D&C (status post dilation and curettage) Z98.890       Past Medical History:        Diagnosis Date    OCD (obsessive compulsive disorder)     PONV (postoperative nausea and vomiting)        Past Surgical History:        Procedure Laterality Date    ADENOIDECTOMY      IL INDUCED ABORTN BY DIL/EVAC N/A 7/3/2018    DILATATION AND CURETTAGE SUCTION performed by Hari Hernandez MD at [address] EXTRACTION Bilateral 2013       Social History:    Social History     Tobacco Use    Smoking status: Former Smoker    Smokeless tobacco: Never Used   Substance Use Topics    Alcohol use:  Yes     Alcohol/week: 0.0 oz     Comment: occ - none since current pregnancy                                 Counseling given: Not Answered      Vital Signs (Current):   Vitals:    05/03/19 1748 05/03/19 1818 05/03/19 1848 05/03/19 1918   BP: 134/89 117/77 119/70 122/71   Pulse: 85 80 80 95   Resp:    20   Temp:       TempSrc:       Weight:       Height:                                                  BP Readings from Last 3 Encounters:   05/03/19 122/71   02/01/19 135/64   07/03/18 105/64       NPO Status: Time of last liquid Anesthetic plan and risks discussed with patient.                       Trudy Page MD   5/3/2019

## 2019-05-03 NOTE — FLOWSHEET NOTE
PT here with c/o SROM since 0330 this AM. States she woke up and had a \"big gush\". Has been leaking clear fluid ever since. Feeling fetal mov't. Denies any pain. Does feel some abdominal tightening at times. No bleeding. Oriented to room and surroundings. Urine obtained for routine drug screen and verbal consent given per patient. EFM applied to soft nontender abdomen. Amnisure initiated.

## 2019-05-03 NOTE — FLOWSHEET NOTE
Dr Bee De Luna called and update given. Informed pt was still 2cm thick and posterior at last vag exam, pt not uncomfortable with contractions. Reactive FHR tracing, pitocin running.  Continue plan of care

## 2019-05-03 NOTE — FLOWSHEET NOTE
Dr Laurent Patella called in for update. Informed no recent vag exam d/t pt not uncomfortable with contractions yet. Contractions every 1-3 min, reactive FHR tracing.  States she is signing out to Dr Patricia Joseph

## 2019-05-03 NOTE — PLAN OF CARE
Problem: Anxiety:  Goal: Level of anxiety will decrease  Description  Level of anxiety will decrease  5/3/2019 1928 by Jayla Thompson RN  Note:   Pt remains calm about the birthing experience,  at bedside, supportive. All questions/concerns addressed by RN.   5/3/2019 0932 by Tai Swain RN  Outcome: Ongoing  Note:   Pt is calm and cooperative     Problem: Infection - Intrapartum Infection:  Goal: Will show no infection signs and symptoms  Description  Will show no infection signs and symptoms  5/3/2019 1928 by Jayla Thompson RN  Note:   Vitals stable, pt remains afebrile. FHT's remain reassuring, will continue to monitor. 5/3/2019 0932 by Tai Swain RN  Outcome: Ongoing  Note:   No signs of infection     Problem: Labor Process - Prolonged:  Goal: Uterine contractions within specified parameters  Description  Uterine contractions within specified parameters  5/3/2019 1928 by Jayla Thompson RN  Note:   Pt on Pitocin, having regular contractions. Will continue to monitor  5/3/2019 0932 by Tai Swain RN  Outcome: Ongoing  Note:   Having irregular contractions on her own, pitocin started     Problem: Pain - Acute:  Goal: Able to cope with pain  Description  Able to cope with pain  5/3/2019 1928 by Jayla Thompson RN  Note:   Pt breathing well with ctx. Will offer pain meds as desired  for discomfort  5/3/2019 0932 by Tai Swain RN  Outcome: Ongoing  Note:   Pt planning epidural for pain, Pain goal a 5     Problem: Tissue Perfusion - Uteroplacental, Altered:  Goal: Absence of abnormal fetal heart rate pattern  Description  Absence of abnormal fetal heart rate pattern  5/3/2019 1928 by Jayla Thompson RN  Note:   Fht's regular and strong with accels.  Will continue to monitor  5/3/2019 0932 by Tai Swain RN  Outcome: Ongoing  Note:   Reactive FHR tracing     Problem: Urinary Retention:  Goal: Urinary elimination within specified parameters  Description  Urinary

## 2019-05-03 NOTE — H&P
[address] [address]  History and Physical Update    Pt Name: Berhane Sparrow  MRN: [id number]  YOB: 1990  Date of evaluation: 5/3/2019    [] I have examined the patient and reviewed the H&P/Consult and there are no changes to the patient or plans. [x] I have examined the patient and reviewed the H&P/Consult and have noted the following changes:     29yo  at 38/3 presents c/o leaking fluid. CE: 1-2cm per nursing. Pos amnisure. Will augment labor with pitocin.         Royal Samson  Electronically signed 5/3/2019 at 8:41 AM

## 2019-05-03 NOTE — FLOWSHEET NOTE
Dr Tiffanie Obando at [facility], informed of pts admission and here with c/o SROM since 0330 this AM. Clear fluid noted, amnisure positive, Dr reviews monitor strip.

## 2019-05-04 PROCEDURE — 7200000001 HC VAGINAL DELIVERY

## 2019-05-04 PROCEDURE — 2580000003 HC RX 258: Performed by: OBSTETRICS & GYNECOLOGY

## 2019-05-04 PROCEDURE — 6370000000 HC RX 637 (ALT 250 FOR IP): Performed by: OBSTETRICS & GYNECOLOGY

## 2019-05-04 PROCEDURE — 2709999900 HC NON-CHARGEABLE SUPPLY

## 2019-05-04 PROCEDURE — 0KQM0ZZ REPAIR PERINEUM MUSCLE, OPEN APPROACH: ICD-10-PCS | Performed by: OBSTETRICS & GYNECOLOGY

## 2019-05-04 PROCEDURE — 88307 TISSUE EXAM BY PATHOLOGIST: CPT

## 2019-05-04 PROCEDURE — 1220000000 HC SEMI PRIVATE OB R&B

## 2019-05-04 RX ORDER — ONDANSETRON 4 MG/1
4 TABLET, ORALLY DISINTEGRATING ORAL EVERY 6 HOURS PRN
Status: DISCONTINUED | OUTPATIENT
Start: 2019-05-04 | End: 2019-05-06 | Stop reason: HOSPADM

## 2019-05-04 RX ORDER — SODIUM CHLORIDE 0.9 % (FLUSH) 0.9 %
10 SYRINGE (ML) INJECTION PRN
Status: DISCONTINUED | OUTPATIENT
Start: 2019-05-04 | End: 2019-05-04

## 2019-05-04 RX ORDER — LANOLIN 100 %
OINTMENT (GRAM) TOPICAL PRN
Status: DISCONTINUED | OUTPATIENT
Start: 2019-05-04 | End: 2019-05-06 | Stop reason: HOSPADM

## 2019-05-04 RX ORDER — IBUPROFEN 800 MG/1
800 TABLET ORAL EVERY 8 HOURS
Status: DISCONTINUED | OUTPATIENT
Start: 2019-05-04 | End: 2019-05-06 | Stop reason: HOSPADM

## 2019-05-04 RX ORDER — METHYLERGONOVINE MALEATE 0.2 MG/ML
200 INJECTION INTRAVENOUS PRN
Status: DISCONTINUED | OUTPATIENT
Start: 2019-05-04 | End: 2019-05-06 | Stop reason: HOSPADM

## 2019-05-04 RX ORDER — CARBOPROST TROMETHAMINE 250 UG/ML
250 INJECTION, SOLUTION INTRAMUSCULAR
Status: DISPENSED | OUTPATIENT
Start: 2019-05-04 | End: 2019-05-04

## 2019-05-04 RX ORDER — MORPHINE SULFATE 4 MG/ML
2 INJECTION, SOLUTION INTRAMUSCULAR; INTRAVENOUS
Status: DISCONTINUED | OUTPATIENT
Start: 2019-05-04 | End: 2019-05-06 | Stop reason: HOSPADM

## 2019-05-04 RX ORDER — HYDROCODONE BITARTRATE AND ACETAMINOPHEN 5; 325 MG/1; MG/1
2 TABLET ORAL EVERY 4 HOURS PRN
Status: DISCONTINUED | OUTPATIENT
Start: 2019-05-04 | End: 2019-05-06 | Stop reason: HOSPADM

## 2019-05-04 RX ORDER — ZOLPIDEM TARTRATE 10 MG/1
10 TABLET ORAL NIGHTLY PRN
Status: DISCONTINUED | OUTPATIENT
Start: 2019-05-04 | End: 2019-05-06 | Stop reason: HOSPADM

## 2019-05-04 RX ORDER — MORPHINE SULFATE 4 MG/ML
4 INJECTION, SOLUTION INTRAMUSCULAR; INTRAVENOUS
Status: DISCONTINUED | OUTPATIENT
Start: 2019-05-04 | End: 2019-05-06 | Stop reason: HOSPADM

## 2019-05-04 RX ORDER — DOCUSATE SODIUM 100 MG/1
100 CAPSULE, LIQUID FILLED ORAL 2 TIMES DAILY
Status: DISCONTINUED | OUTPATIENT
Start: 2019-05-04 | End: 2019-05-06 | Stop reason: HOSPADM

## 2019-05-04 RX ORDER — FERROUS SULFATE 325(65) MG
325 TABLET ORAL
Status: DISCONTINUED | OUTPATIENT
Start: 2019-05-04 | End: 2019-05-06 | Stop reason: HOSPADM

## 2019-05-04 RX ORDER — ACETAMINOPHEN 325 MG/1
650 TABLET ORAL EVERY 4 HOURS PRN
Status: DISCONTINUED | OUTPATIENT
Start: 2019-05-04 | End: 2019-05-06 | Stop reason: HOSPADM

## 2019-05-04 RX ORDER — SODIUM CHLORIDE 0.9 % (FLUSH) 0.9 %
10 SYRINGE (ML) INJECTION EVERY 12 HOURS SCHEDULED
Status: DISCONTINUED | OUTPATIENT
Start: 2019-05-04 | End: 2019-05-04

## 2019-05-04 RX ORDER — DIPHENHYDRAMINE HCL 25 MG
25 TABLET ORAL EVERY 6 HOURS PRN
Status: DISCONTINUED | OUTPATIENT
Start: 2019-05-04 | End: 2019-05-06 | Stop reason: HOSPADM

## 2019-05-04 RX ORDER — ONDANSETRON 2 MG/ML
8 INJECTION INTRAMUSCULAR; INTRAVENOUS EVERY 8 HOURS PRN
Status: DISCONTINUED | OUTPATIENT
Start: 2019-05-04 | End: 2019-05-06 | Stop reason: HOSPADM

## 2019-05-04 RX ADMIN — IBUPROFEN 800 MG: 800 TABLET ORAL at 17:30

## 2019-05-04 RX ADMIN — IBUPROFEN 800 MG: 800 TABLET ORAL at 06:26

## 2019-05-04 RX ADMIN — SODIUM CHLORIDE, POTASSIUM CHLORIDE, SODIUM LACTATE AND CALCIUM CHLORIDE: 600; 310; 30; 20 INJECTION, SOLUTION INTRAVENOUS at 01:13

## 2019-05-04 RX ADMIN — HYDROCODONE BITARTRATE AND ACETAMINOPHEN 2 TABLET: 5; 325 TABLET ORAL at 14:16

## 2019-05-04 RX ADMIN — DOCUSATE SODIUM 100 MG: 100 CAPSULE, LIQUID FILLED ORAL at 20:43

## 2019-05-04 ASSESSMENT — PAIN SCALES - GENERAL
PAINLEVEL_OUTOF10: 5
PAINLEVEL_OUTOF10: 0
PAINLEVEL_OUTOF10: 7

## 2019-05-04 NOTE — LACTATION NOTE
Lactation  Consult  5/4/2019     On this visit with Maria Del Rosario Nicoleine, patient states that she is having trouble getting infant to wake up to nurse. Pt states that her nipples are getting a little sore. Discussed ways to get a deep latch. Encouraged Pt to use lanolin on nipples. Discussed ways to wake a sleepy infant, STS, and burping prior to feeds. Encouraged Pt to call out for assistance a needed. Will follow up PRN. At this visit we discussed handout, normal feeding patterns for first 24 hours and beyond, position and latch techniques, frequency and duration, skin to skin, cues, burping, waking, hand expression, breast care, baby elimination patterns, community support, breast compression and establishing breast milk production/supply     Demo completed:hand expression, cues and waking & burping techniques    Additional items given: N/A    Encouraged skin to skin/kangaroo care. Offered verbal tips and assistance and encouraged to call and use support group prn.     Electronically signed by Maricarmen Mosley RN,IBCLC,RLC on 5/4/2019 at 11:17 AM

## 2019-05-04 NOTE — FLOWSHEET NOTE
Pt up to bathroom and voided a large amount. Christa care and teaching given. Small amount of lochia rubra noted. Back to bed, gait steady. Fundus is even with umbilicus, firm and center. pt voiced to call for next void.

## 2019-05-04 NOTE — ANESTHESIA PROCEDURE NOTES
Epidural Block    Patient location during procedure: OB  Reason for block: labor epidural  Staffing  Anesthesiologist: Bal Palacios MD  Performed: anesthesiologist   Preanesthetic Checklist  Completed: patient identified, site marked, surgical consent, pre-op evaluation, timeout performed, IV checked, risks and benefits discussed, monitors and equipment checked, anesthesia consent given, oxygen available and patient being monitored  Epidural  Location: lumbar (1-5)  Needle  Catheter type: side hole  Additional Notes  Procedure: Labor Epidural (21790)     Diagnosis: Vaginal Delivery (650N)    Procedure Start Time:  1955  Procedure End Time:  2005    Allergies:  Bactrim (sulfamethoxazole-trimethoprim)    H&P Status: H&P was reviewed, the patient was examined and no change has occurred in patient's condition since H&P was completed. Diagnostic Data Review:  PT/INR  No results found for: PTINR  PT/INR Warfarin:  No components found for: PTPATWAR, PTINRWAR  PTT: No results found for: APTT  PTT Heparin: No components found for: APTTHEP  CBC: Lab Results       Component                Value               Date                       WBC                      8.3                 05/03/2019                 RBC                      4.09                05/03/2019                 HGB                      12.2                05/03/2019                 HCT                      36.7                05/03/2019                 MCV                      89.7                05/03/2019                 RDW                      12.5                01/05/2013                 PLT                      224                 05/03/2019              Consent:  Risks and benefits of the labor epidural were discussed and the patient was given the opportunity to ask questions. Informed consent was obtained. Procedure:  Position: Sitting. Sterile technique: Hat,mask,gloves. Skin Prep/Drape: Betadine.   Skin Local: 3ml 1% lidocaine  Interspace: L4-L5   Catheter Distances:  Skin to epidural space 6cm. Catheter 11cm at skin. Aspiration for CSF/Blood: Negative    Paresthesia: Negative    Test dose: Negative (3ml 1. 5%Lidocaine with 1:200,000 Epinephrine)    Difficulties/Complications: None    Epidural Medication:  Bolus Dose:   Premixed Syringe: Ropivacaine 0.2% 0ml given. Injection was made incrementally with constant monitoring and aspiration. Infusion Dose:  Premixed Bag: Ropivacaine 0.1% with Fentanyl 3 mcg/ml started at 16 ml/hr. Catheter bolused with 15 mL from premixed bag.     Sharonda Dupont MD (ATTENDING ANESTHESIOLOGIST: Imm)    8:08 PM

## 2019-05-04 NOTE — LACTATION NOTE
Assisted Pt with latch. Pt states  deeper latch feeling much better. Re educated patient in the importance of compressing breast tissue to allow infant to latch deeper. Encouraged patient to call out for assistance as needed. Will follow up PRN.

## 2019-05-04 NOTE — PLAN OF CARE
Problem: Discharge Planning:  Goal: Discharged to appropriate level of care  Description  Discharged to appropriate level of care  5/4/2019 1217 by Cristiane Lenz RN  Outcome: Ongoing  Note:   Plan of care discussed     Problem: Constipation:  Goal: Bowel elimination is within specified parameters  Description  Bowel elimination is within specified parameters  5/4/2019 1217 by Cristiane Lenz RN  Outcome: Ongoing  Note:   Encouraged fluids and fiber     Problem: Fluid Volume - Imbalance:  Goal: Absence of postpartum hemorrhage signs and symptoms  Description  Absence of postpartum hemorrhage signs and symptoms  5/4/2019 1217 by Cristiane Lenz RN  Outcome: Ongoing  Note:   Minimal bleeding noted     Problem: Mood - Altered:  Goal: Mood stable  Description  Mood stable  5/4/2019 1217 by Cristiane Lenz RN  Outcome: Ongoing  Note:   Bonding well with infant   Care plan reviewed with patient. Patient verbalize understanding of the plan of care and contribute to goal setting.

## 2019-05-04 NOTE — FLOWSHEET NOTE
Pt transferred to postpartum per w/c with infant in stable condition. Report given to KRYSTLE Gonzalez for continued care.

## 2019-05-04 NOTE — FLOWSHEET NOTE
Patient arrived to [phone] via wheelchair with  in arms.  Oliverio Grandchild at bedside. Report received from [address] [place] [address]. Patient oriented to room. Report then Given to Lola GATES RN.  Lisa Espinosa

## 2019-05-04 NOTE — L&D DELIVERY NOTE
Department of Obstetrics and Gynecology   Vaginal Delivery Note at [facility]  Date of Delivery:  2019    Procedure:  Spontaneous vaginal delivery and repair second degree spontaneous right labial laceration    Surgeon:   Garfield Trujillo MD    Anesthesia:  epidural anesthesia    Estimated blood loss:  350ml    Cord blood and cord segment collected Yes    Complications:  none    Condition:  infant stable to general nursery and mother stable    Details of Procedure: The patient is a 33 y.o. female at 38w3d   OB History        2    Para        Term                AB   1    Living           SAB   1    TAB        Ectopic        Molar        Multiple        Live Births                 who was admitted for active phase labor. She received the following interventions: IV Pitocin augmentation. The patient progressed well, became complete and started to push. Patient progressed well and the fetal head was delivered over an intact perineum, nose and mouth suctioned with bulb suction and the rest of the infant delivered atraumatically, placed on mother abdomen. Cord was clamped and cut and infant handed off to the waiting nurse for evaluation. The delivery of the placenta was spontaneous. The perineum and vagina were explored and a second degree right labial laceration was repaired in standard fashion with interupted 3-0 Vicryl. A vaginal sweep was then performed and any clots were evacuated. All sharps were then discarded and the sponge/instrument count was correct. Male Infant, weight pending, Apgars 8 and 9.     Infant Wt:   Information for the patient's :  Teddy Mccauley [[id number]]             APGARS:     Information for the patient's :  Teddy Mccauley [[id number]]          Dashawn Lemon MD

## 2019-05-05 PROCEDURE — 1220000000 HC SEMI PRIVATE OB R&B

## 2019-05-05 PROCEDURE — 2709999900 HC NON-CHARGEABLE SUPPLY

## 2019-05-05 PROCEDURE — 6370000000 HC RX 637 (ALT 250 FOR IP): Performed by: OBSTETRICS & GYNECOLOGY

## 2019-05-05 RX ADMIN — IBUPROFEN 800 MG: 800 TABLET ORAL at 01:11

## 2019-05-05 RX ADMIN — DOCUSATE SODIUM 100 MG: 100 CAPSULE, LIQUID FILLED ORAL at 10:06

## 2019-05-05 RX ADMIN — HYDROCODONE BITARTRATE AND ACETAMINOPHEN 2 TABLET: 5; 325 TABLET ORAL at 01:11

## 2019-05-05 RX ADMIN — DOCUSATE SODIUM 100 MG: 100 CAPSULE, LIQUID FILLED ORAL at 22:07

## 2019-05-05 RX ADMIN — IBUPROFEN 800 MG: 800 TABLET ORAL at 23:29

## 2019-05-05 RX ADMIN — IBUPROFEN 800 MG: 800 TABLET ORAL at 15:29

## 2019-05-05 RX ADMIN — HYDROCODONE BITARTRATE AND ACETAMINOPHEN 2 TABLET: 5; 325 TABLET ORAL at 15:29

## 2019-05-05 RX ADMIN — HYDROCODONE BITARTRATE AND ACETAMINOPHEN 2 TABLET: 5; 325 TABLET ORAL at 22:07

## 2019-05-05 ASSESSMENT — PAIN SCALES - GENERAL
PAINLEVEL_OUTOF10: 7
PAINLEVEL_OUTOF10: 8
PAINLEVEL_OUTOF10: 6
PAINLEVEL_OUTOF10: 7

## 2019-05-05 NOTE — LACTATION NOTE
Pt states infant is nursing well but she is starting to get sore. Pt states she is afraid infant is not getting enough. Infant currently has a wet diaper. Discussed appropriate amounts of colostrum to expect at this time. Pt requested to try a nipple shield. Nipple shield teaching provided. Home pump teaching provided. Encouraged Pt to pump prior to and after shield use to promote milk supply. Discussed if Pt chooses to supplement then make sure infant is breast feeding first. Encouraged Pt to call out for assistance as needed will follow up PRN.

## 2019-05-05 NOTE — LACTATION NOTE
Upon observation possible short lingual frenulum noted. Explained to patient signs and symptoms of improper milk transfer. Discussed proper tongue movement and proper comfort of latch. Educated that [employer] can not diagnose a short lingual frenulum and provided patient with physician handout for further evaluation.  Will follow up PRN

## 2019-05-05 NOTE — PROGRESS NOTES
Department of Obstetrics and Gynecology  Labor and Delivery  Attending Post Partum Progress Note    PPD #1    SUBJECTIVE: Feeling well. No complaints. OBJECTIVE:     Vitals:  /76   Pulse 105   Temp 98 °F (36.7 °C) (Oral)   Resp 16   Ht 5' 10\" (1.778 m)   Wt 218 lb (98.9 kg)   LMP 08/07/2018   SpO2 100%   Breastfeeding? Unknown   BMI 31.28 kg/m²     Uterus:  normal size, well involuted, firm, non-tender    DATA:    Hemoglobin:   Lab Results   Component Value Date    HGB 12.2 05/03/2019       ASSESSMENT & PLAN:  Doing well. Anticipate home on post partum day #2.     Yousuf Devlin 5/5/2019

## 2019-05-05 NOTE — ANESTHESIA POSTPROCEDURE EVALUATION
Department of Anesthesiology  Postprocedure Note    Patient: Russell Quezada  MRN: [id number]  YOB: 1990  Date of evaluation: 5/5/2019  Time:  7:44 AM     Procedure Summary     Date:  05/03/19 Room / Location:      Anesthesia Start:  1955 Anesthesia Stop:  05/04/19 0440    Procedure:  ANESTHESIA LABOR ANALGESIA Diagnosis:      Scheduled Providers:   Responsible Provider:  Kirstie Fabian MD    Anesthesia Type:  epidural ASA Status:  2          Anesthesia Type: epidural    Mk Phase I: Mk Score: 10    Mk Phase II: Mk Score: 10    Last vitals: Reviewed and per EMR flowsheets.        Anesthesia Post Evaluation    Patient location during evaluation: floor  Patient participation: complete - patient participated  Level of consciousness: awake and alert  Airway patency: patent  Nausea & Vomiting: no nausea and no vomiting  Complications: no  Cardiovascular status: hemodynamically stable  Respiratory status: acceptable, room air and spontaneous ventilation  Hydration status: stable

## 2019-05-05 NOTE — PLAN OF CARE
Problem: Pain - Acute:  Goal: Pain level will decrease  Description  Pain level will decrease   5/4/2019 2247 by Lisa Kerr RN  Outcome: Ongoing  Note:   Scheduled motrin given with relief for abdominal cramping and tail bone pain. Heating pad to tail bone. Given with relief. Problem: Pain:  Goal: Pain level will decrease  Description  Pain level will decrease   5/4/2019 2247 by Lisa Kerr RN  Outcome: Ongoing  Note:   Scheduled motrin given with relief for abdominal cramping and tail bone pain. Heating pad to tail bone. Given with relief. Problem: Discharge Planning:  Goal: Discharged to appropriate level of care  Description  Discharged to appropriate level of care  5/4/2019 2247 by Huma Alvarenga RN  Outcome: Ongoing  Note:   Plan is to be discharged home with . Problem: Constipation:  Goal: Bowel elimination is within specified parameters  Description  Bowel elimination is within specified parameters  5/4/2019 2247 by Lisa Kerr RN  Outcome: Ongoing  Note:   Patient passing gas. Problem: Fluid Volume - Imbalance:  Goal: Absence of postpartum hemorrhage signs and symptoms  Description  Absence of postpartum hemorrhage signs and symptoms  5/4/2019 2247 by Lisa Kerr RN  Outcome: Ongoing  Note:   Vaginal bleeding WNL, no clots or foul odors. Problem: Mood - Altered:  Goal: Mood stable  Description  Mood stable  5/4/2019 2247 by Lisa Kerr RN  Outcome: Ongoing  Note:   Emotional support provided. Care plan reviewed with patient and she contributes to goal setting and voices understanding of plan of care.

## 2019-05-05 NOTE — PLAN OF CARE
Problem: Pain - Acute:  Goal: Pain level will decrease  Description  Pain level will decrease   5/5/2019 1022 by Vasu Johnston RN  Outcome: Ongoing  Note:   Goal is 6, pt taking motrin for pain relief     Problem: Pain:  Goal: Pain level will decrease  Description  Pain level will decrease   5/5/2019 1022 by Vasu Johnston RN  Outcome: Ongoing  Note:   Goal is 6, pt taking motrin for pain relief     Problem: Discharge Planning:  Goal: Discharged to appropriate level of care  Description  Discharged to appropriate level of care  5/5/2019 1022 by Vasu Johnston RN  Outcome: Ongoing  Note:   Plan of care discussed     Problem: Constipation:  Goal: Bowel elimination is within specified parameters  Description  Bowel elimination is within specified parameters  5/5/2019 1022 by Vasu Johnston RN  Outcome: Ongoing  Note:   Encouraged fluids and fiber, colace given     Problem: Fluid Volume - Imbalance:  Goal: Absence of postpartum hemorrhage signs and symptoms  Description  Absence of postpartum hemorrhage signs and symptoms  5/5/2019 1022 by Vasu Johnston RN  Outcome: Ongoing  Note:   Minimal bleeding noted     Problem: Mood - Altered:  Goal: Mood stable  Description  Mood stable  5/5/2019 1022 by Vasu Johnston RN  Outcome: Ongoing  Note:   Bonding well with infant   Care plan reviewed with patient. Patient verbalize understanding of the plan of care and contribute to goal setting.

## 2019-05-06 VITALS
HEART RATE: 100 BPM | HEIGHT: 70 IN | RESPIRATION RATE: 18 BRPM | WEIGHT: 218 LBS | BODY MASS INDEX: 31.21 KG/M2 | TEMPERATURE: 98.4 F | DIASTOLIC BLOOD PRESSURE: 66 MMHG | SYSTOLIC BLOOD PRESSURE: 111 MMHG | OXYGEN SATURATION: 100 %

## 2019-05-06 LAB
HCT VFR BLD CALC: 32.1 % (ref 37–47)
HEMOGLOBIN: 10.9 GM/DL (ref 12–16)

## 2019-05-06 PROCEDURE — 85018 HEMOGLOBIN: CPT

## 2019-05-06 PROCEDURE — 85014 HEMATOCRIT: CPT

## 2019-05-06 PROCEDURE — 2709999900 HC NON-CHARGEABLE SUPPLY

## 2019-05-06 PROCEDURE — 6370000000 HC RX 637 (ALT 250 FOR IP): Performed by: OBSTETRICS & GYNECOLOGY

## 2019-05-06 PROCEDURE — 36415 COLL VENOUS BLD VENIPUNCTURE: CPT

## 2019-05-06 RX ADMIN — HYDROCODONE BITARTRATE AND ACETAMINOPHEN 2 TABLET: 5; 325 TABLET ORAL at 13:27

## 2019-05-06 RX ADMIN — IBUPROFEN 800 MG: 800 TABLET ORAL at 08:28

## 2019-05-06 RX ADMIN — HYDROCODONE BITARTRATE AND ACETAMINOPHEN 2 TABLET: 5; 325 TABLET ORAL at 08:27

## 2019-05-06 RX ADMIN — DOCUSATE SODIUM 100 MG: 100 CAPSULE, LIQUID FILLED ORAL at 08:27

## 2019-05-06 ASSESSMENT — PAIN SCALES - GENERAL
PAINLEVEL_OUTOF10: 7

## 2019-05-06 NOTE — LACTATION NOTE
Pt. Stated she has no questions or concerns at this time. Pt. Stated that she has a breast pump for home use. Encouraged pt. To call lactation for assistance. Encouraged pt. To call for an make an appointment for group. Encouraged pt. To burp infant before feeds.

## 2019-05-06 NOTE — PLAN OF CARE
Problem: Pain - Acute:  Goal: Pain level will decrease  Description  Pain level will decrease   Outcome: Ongoing  Note:   Scheduled motrin and PRN norco given with relief for tailbone pain. Problem: Discharge Planning:  Goal: Discharged to appropriate level of care  Description  Discharged to appropriate level of care  Outcome: Ongoing  Note:   Plan is to be discharged home with . Problem: Constipation:  Goal: Bowel elimination is within specified parameters  Description  Bowel elimination is within specified parameters  Outcome: Ongoing  Note:   Patient passing gas. Problem: Fluid Volume - Imbalance:  Goal: Absence of postpartum hemorrhage signs and symptoms  Description  Absence of postpartum hemorrhage signs and symptoms  Outcome: Ongoing  Note:   Vaginal bleeding WNL, no clots or foul odors. Problem: Mood - Altered:  Goal: Mood stable  Description  Mood stable  Outcome: Ongoing  Note:   Emotional support provided. Care plan reviewed with patient and she contributes to goal setting and voices understanding of plan of care.

## 2019-05-06 NOTE — PLAN OF CARE
Problem: Pain - Acute:  Goal: Pain level will decrease  Description  Pain level will decrease   5/6/2019 0959 by Janusz Christianson RN  Outcome: Ongoing  Note:   Goal is 6, pt taking motrin and norco for pain relief     Problem: Pain:  Goal: Pain level will decrease  Description  Pain level will decrease   5/6/2019 0959 by Janusz Christianson RN  Outcome: Ongoing  Note:   Goal is 6, pt taking motrin and norco for pain relief     Problem: Discharge Planning:  Goal: Discharged to appropriate level of care  Description  Discharged to appropriate level of care  5/6/2019 0959 by Janusz Christianson RN  Outcome: Ongoing  Note:   Plans to go home today     Problem: Constipation:  Goal: Bowel elimination is within specified parameters  Description  Bowel elimination is within specified parameters  5/6/2019 0959 by Janusz Christianson RN  Outcome: Ongoing  Note:   Encouraged fluids and fiber     Problem: Fluid Volume - Imbalance:  Goal: Absence of postpartum hemorrhage signs and symptoms  Description  Absence of postpartum hemorrhage signs and symptoms  5/6/2019 0959 by Janusz Christianson RN  Outcome: Ongoing  Note:   Minimal bleeding noted     Problem: Mood - Altered:  Goal: Mood stable  Description  Mood stable  5/6/2019 0959 by Janusz Christianson RN  Outcome: Ongoing  Note:   Bonding well with infant   Care plan reviewed with patient. Patient verbalize understanding of the plan of care and contribute to goal setting.

## 2019-05-06 NOTE — PROGRESS NOTES
Department of Obstetrics and Gynecology  Labor and Delivery  Attending Post Partum Progress Note    PPD #2    SUBJECTIVE: Feeling well. No complaints. OBJECTIVE:     Vitals:  /60   Pulse 110   Temp 98 °F (36.7 °C) (Oral)   Resp 16   Ht 5' 10\" (1.778 m)   Wt 218 lb (98.9 kg)   LMP 08/07/2018   SpO2 100%   Breastfeeding? Unknown   BMI 31.28 kg/m²     Uterus:  normal size, well involuted, firm, non-tender    DATA:    Hemoglobin:   Lab Results   Component Value Date    HGB 10.9 05/06/2019       ASSESSMENT & PLAN:  Doing well. Anticipate home on post partum day #2.     Roseline Chery 5/6/2019

## 2019-05-06 NOTE — DISCHARGE SUMMARY
Vaginal Delivery Discharge Summary    Gestational Age:38w4d    Antepartum complications: none    Admission date: 5/3/2019  8:17 AM      Type of Delivery:       Data  Information for the patient's :  Lucia Geller [[id number]]   male  Birth Weight: 7 lb 15.5 oz (3.615 kg)      Labs: CBC   Lab Results   Component Value Date    HGB 10.9 (L) 2019    HCT 32.1 (L) 2019        Intrapartum complications: None    Postpartum complications: none    The patient is ambulating well. The patient is tolerating a normal diet. Patient Instructions: Activity: activity as tolerated and no sex for 6 weeks  Diet: regular  Wound Care: as directed    Discharge Information  Current Discharge Medication List      CONTINUE these medications which have NOT CHANGED    Details   Prenatal MV-Min-Fe Fum-FA-DHA (PRENATAL 1 PO) Take 1 tablet by mouth             No discharge procedures on file.     Condition: Good    Plan:   Follow up in 5 week(s)    Electronically signed by Roseline Chery MD on 2019 at 8:43 AM

## 2020-01-31 ENCOUNTER — OFFICE VISIT (OUTPATIENT)
Dept: FAMILY MEDICINE CLINIC | Age: 30
End: 2020-01-31
Payer: COMMERCIAL

## 2020-01-31 VITALS
DIASTOLIC BLOOD PRESSURE: 70 MMHG | OXYGEN SATURATION: 98 % | HEART RATE: 94 BPM | WEIGHT: 177.8 LBS | SYSTOLIC BLOOD PRESSURE: 118 MMHG | BODY MASS INDEX: 25.45 KG/M2 | HEIGHT: 70 IN

## 2020-01-31 PROCEDURE — 99213 OFFICE O/P EST LOW 20 MIN: CPT | Performed by: FAMILY MEDICINE

## 2020-01-31 RX ORDER — AMOXICILLIN AND CLAVULANATE POTASSIUM 500; 125 MG/1; MG/1
1 TABLET, FILM COATED ORAL 2 TIMES DAILY
Qty: 20 TABLET | Refills: 0 | Status: SHIPPED | OUTPATIENT
Start: 2020-01-31 | End: 2020-02-10

## 2020-02-19 ENCOUNTER — TELEPHONE (OUTPATIENT)
Dept: FAMILY MEDICINE CLINIC | Age: 30
End: 2020-02-19

## 2020-02-19 RX ORDER — FLUCONAZOLE 150 MG/1
150 TABLET ORAL DAILY
Qty: 1 TABLET | Refills: 0 | Status: SHIPPED | OUTPATIENT
Start: 2020-02-19 | End: 2020-02-20

## 2020-02-19 NOTE — TELEPHONE ENCOUNTER
[facility] is a patient of Dr. Cara luacs. She was prescribed Augmentin on 01/31/2020 for sinus. She is finishes with this but now has a yeast infection. Can you send Diflucan in for her she is willing to come in for an appointment if needed. She uses [facility] we are to call and let her know. Since Dr. Cara Mccauley was on vacation I routed this to you to be addresses as soon as possible.

## 2020-02-20 ENCOUNTER — HOSPITAL ENCOUNTER (EMERGENCY)
Age: 30
Discharge: HOME | End: 2020-02-20
Payer: COMMERCIAL

## 2020-02-20 VITALS
SYSTOLIC BLOOD PRESSURE: 131 MMHG | HEIGHT: 71 IN | OXYGEN SATURATION: 100 % | TEMPERATURE: 98.1 F | RESPIRATION RATE: 12 BRPM | DIASTOLIC BLOOD PRESSURE: 84 MMHG | WEIGHT: 174 LBS | HEART RATE: 94 BPM | BODY MASS INDEX: 24.36 KG/M2

## 2020-02-20 LAB
BILIRUBIN URINE: NEGATIVE
BLOOD, URINE: ABNORMAL
CHARACTER, URINE: ABNORMAL
COLOR: YELLOW
GLUCOSE, URINE: NEGATIVE MG/DL
KETONES, URINE: NEGATIVE
LEUKOCYTES, UA: ABNORMAL
NITRITE, URINE: NEGATIVE
PH UA: 7.5 (ref 5–9)
PROTEIN UA: 100 MG/DL
REFLEX TO URINE C & S: ABNORMAL
SPECIFIC GRAVITY UA: 1.02 (ref 1–1.03)
UROBILINOGEN, URINE: 0.2 EU/DL (ref 0–1)

## 2020-02-20 PROCEDURE — 81003 URINALYSIS AUTO W/O SCOPE: CPT

## 2020-02-20 PROCEDURE — 87077 CULTURE AEROBIC IDENTIFY: CPT

## 2020-02-20 PROCEDURE — 87186 SC STD MICRODIL/AGAR DIL: CPT

## 2020-02-20 PROCEDURE — 99213 OFFICE O/P EST LOW 20 MIN: CPT | Performed by: NURSE PRACTITIONER

## 2020-02-20 PROCEDURE — 99213 OFFICE O/P EST LOW 20 MIN: CPT

## 2020-02-20 PROCEDURE — 87086 URINE CULTURE/COLONY COUNT: CPT

## 2020-02-20 RX ORDER — NITROFURANTOIN 25; 75 MG/1; MG/1
100 CAPSULE ORAL 2 TIMES DAILY
Qty: 10 CAPSULE | Refills: 0 | Status: SHIPPED | OUTPATIENT
Start: 2020-02-20 | End: 2020-02-25

## 2020-02-20 ASSESSMENT — ENCOUNTER SYMPTOMS
ABDOMINAL PAIN: 0
SHORTNESS OF BREATH: 0

## 2020-02-20 NOTE — ED NOTES
PT GIVEN DISCHARGE INSTRUCTIONS, VERBALIZES UNDERSTANDING. PT ASSESSMENT UNCHANGED, DISCHARGED IN STABLE CONDITION.         Anthony Richard RN  02/20/20 [id number]

## 2020-02-20 NOTE — ED PROVIDER NOTES
[address]00 [address]       Chief Complaint   Patient presents with    Urinary Frequency    Dysuria       Nurses Notes reviewed and I agree except as noted in the HPI. HISTORY OF PRESENT ILLNESS   Esha Knight is a 30 y.o. female who presents with complaints of possible UTI. Onset of symptoms less than 3 days ago, unchanged. Patient complains of dysuria and urinary frequency. Denies hematuria, abdominal pain, or back pain. Patient recently completed Augmentin, concern for possible yeast infection. Patient did complete an  Monistat. Patient actively trying to get pregnant. Last menstrual period 2/3/2020. Patient did not take Diflucan that was recently prescribed. REVIEW OF SYSTEMS     Review of Systems   Constitutional: Negative for fever. Respiratory: Negative for shortness of breath. Cardiovascular: Negative for chest pain. Gastrointestinal: Negative for abdominal pain. Genitourinary: Positive for dysuria and frequency. Negative for decreased urine volume, difficulty urinating, flank pain, genital sores, hematuria, menstrual problem, pelvic pain, urgency, vaginal bleeding, vaginal discharge and vaginal pain. Musculoskeletal: Negative for neck pain and neck stiffness. Skin: Negative for rash. Neurological: Negative for dizziness and headaches. Hematological: Negative for adenopathy. Psychiatric/Behavioral: Negative for sleep disturbance. PAST MEDICAL HISTORY         Diagnosis Date    OCD (obsessive compulsive disorder)     PONV (postoperative nausea and vomiting)        SURGICAL HISTORY     Patient  has a past surgical history that includes Tonsillectomy; Adenoidectomy; [place] tooth extraction (Bilateral, ); and pr induced abortn by dil/evac (N/A, 7/3/2018).     CURRENT MEDICATIONS       Discharge Medication List as of 2020  3:48 PM      CONTINUE these medications which have NOT CHANGED    Details fluconazole (DIFLUCAN) 150 MG tablet Take 1 tablet by mouth daily for 1 dose, Disp-1 tablet, R-0Normal      Prenatal MV-Min-Fe Fum-FA-DHA (PRENATAL 1 PO) Take 1 tablet by mouthHistorical Med             ALLERGIES     Patient is is allergic to bactrim [sulfamethoxazole-trimethoprim]. FAMILY HISTORY     Patient'sfamily history is not on file. SOCIAL HISTORY     Patient  reports that she has quit smoking. She has never used smokeless tobacco. She reports current alcohol use. She reports that she does not use drugs. PHYSICAL EXAM     ED TRIAGE VITALS  BP: 131/84, Temp: 98.1 °F (36.7 °C), Pulse: 94, Resp: 12, SpO2: 100 %  Physical Exam  Vitals signs and nursing note reviewed. Constitutional:       General: She is not in acute distress. Appearance: Normal appearance. She is well-developed. She is not ill-appearing, toxic-appearing or diaphoretic. HENT:      Head: Normocephalic and atraumatic. Eyes:      Conjunctiva/sclera: Conjunctivae normal.   Pulmonary:      Effort: No respiratory distress. Abdominal:      General: Abdomen is flat. Bowel sounds are normal. There is no distension. Palpations: Abdomen is soft. There is no mass. Tenderness: There is no abdominal tenderness. There is no right CVA tenderness, left CVA tenderness or guarding. Hernia: No hernia is present. Lymphadenopathy:      Lower Body: No right inguinal adenopathy. No left inguinal adenopathy. Skin:     General: Skin is warm and dry. Capillary Refill: Capillary refill takes less than 2 seconds. Neurological:      Mental Status: She is alert and oriented to person, place, and time. Psychiatric:         Mood and Affect: Mood normal.         Behavior: Behavior is cooperative.          DIAGNOSTIC RESULTS   Labs:   Results for orders placed or performed during the hospital encounter of 02/20/20   UA without Microscopic, Reflex C&S   Result Value Ref Range    Glucose, Urine Negative NEGATIVE mg/dl    Bilirubin

## 2020-02-22 LAB
ORGANISM: ABNORMAL
URINE CULTURE REFLEX: ABNORMAL

## 2020-04-18 ENCOUNTER — HOSPITAL ENCOUNTER (OUTPATIENT)
Age: 30
Discharge: HOME | End: 2020-04-18
Payer: COMMERCIAL

## 2020-04-18 LAB
ABO: NORMAL
ANTIBODY SCREEN: NORMAL
BILIRUBIN URINE: NEGATIVE
BLOOD, URINE: NEGATIVE
CHARACTER, URINE: CLEAR
COLOR: YELLOW
ERYTHROCYTE [DISTWIDTH] IN BLOOD BY AUTOMATED COUNT: 12 % (ref 11.5–14.5)
ERYTHROCYTE [DISTWIDTH] IN BLOOD BY AUTOMATED COUNT: 39.2 FL (ref 35–45)
GLUCOSE URINE: NEGATIVE MG/DL
HCT VFR BLD CALC: 39.8 % (ref 37–47)
HEMOGLOBIN: 12.9 GM/DL (ref 12–16)
HIV-1 ANTIBODY: NONREACTIVE
KETONES, URINE: NEGATIVE
LEUKOCYTE ESTERASE, URINE: NEGATIVE
MCH RBC QN AUTO: 29 PG (ref 26–33)
MCHC RBC AUTO-ENTMCNC: 32.4 GM/DL (ref 32.2–35.5)
MCV RBC AUTO: 89.4 FL (ref 81–99)
NITRITE, URINE: NEGATIVE
PH UA: 6 (ref 5–9)
PLATELET # BLD: 272 THOU/MM3 (ref 130–400)
PMV BLD AUTO: 9.1 FL (ref 9.4–12.4)
PROTEIN UA: NEGATIVE
RBC # BLD: 4.45 MILL/MM3 (ref 4.2–5.4)
RH FACTOR: NORMAL
RUBELLA: 56.6 IU/ML
SPECIFIC GRAVITY, URINE: 1.01 (ref 1–1.03)
UROBILINOGEN, URINE: 0.2 EU/DL (ref 0–1)
WBC # BLD: 7 THOU/MM3 (ref 4.8–10.8)

## 2020-04-18 PROCEDURE — 86762 RUBELLA ANTIBODY: CPT

## 2020-04-18 PROCEDURE — 85027 COMPLETE CBC AUTOMATED: CPT

## 2020-04-18 PROCEDURE — 86901 BLOOD TYPING SEROLOGIC RH(D): CPT

## 2020-04-18 PROCEDURE — 86900 BLOOD TYPING SEROLOGIC ABO: CPT

## 2020-04-18 PROCEDURE — 86850 RBC ANTIBODY SCREEN: CPT

## 2020-04-18 PROCEDURE — 81003 URINALYSIS AUTO W/O SCOPE: CPT

## 2020-04-18 PROCEDURE — 87340 HEPATITIS B SURFACE AG IA: CPT

## 2020-04-18 PROCEDURE — 86701 HIV-1ANTIBODY: CPT

## 2020-04-18 PROCEDURE — 87086 URINE CULTURE/COLONY COUNT: CPT

## 2020-04-18 PROCEDURE — 86592 SYPHILIS TEST NON-TREP QUAL: CPT

## 2020-04-18 PROCEDURE — 36415 COLL VENOUS BLD VENIPUNCTURE: CPT

## 2020-04-19 LAB
HEPATITIS B SURFACE ANTIGEN: NEGATIVE
RPR: NONREACTIVE

## 2020-04-21 LAB
ORGANISM: ABNORMAL
URINE CULTURE, ROUTINE: ABNORMAL

## 2020-05-08 ENCOUNTER — HOSPITAL ENCOUNTER (OUTPATIENT)
Age: 30
Discharge: HOME | End: 2020-05-08
Payer: COMMERCIAL

## 2020-05-08 PROCEDURE — 87086 URINE CULTURE/COLONY COUNT: CPT

## 2020-05-09 LAB
ORGANISM: ABNORMAL
URINE CULTURE, ROUTINE: ABNORMAL

## 2020-07-08 ENCOUNTER — OFFICE VISIT (OUTPATIENT)
Dept: FAMILY MEDICINE CLINIC | Age: 30
End: 2020-07-08
Payer: COMMERCIAL

## 2020-07-08 VITALS
SYSTOLIC BLOOD PRESSURE: 104 MMHG | BODY MASS INDEX: 27.37 KG/M2 | DIASTOLIC BLOOD PRESSURE: 62 MMHG | HEART RATE: 60 BPM | WEIGHT: 191.2 LBS | HEIGHT: 70 IN | TEMPERATURE: 98.5 F

## 2020-07-08 LAB
BILIRUBIN, POC: NEGATIVE
BLOOD URINE, POC: ABNORMAL
CLARITY, POC: ABNORMAL
COLOR, POC: YELLOW
GLUCOSE URINE, POC: NEGATIVE
KETONES, POC: NEGATIVE
LEUKOCYTE EST, POC: ABNORMAL
NITRITE, POC: NEGATIVE
PH, POC: 6.5
PROTEIN, POC: ABNORMAL
SPECIFIC GRAVITY, POC: >=1.03
UROBILINOGEN, POC: ABNORMAL

## 2020-07-08 PROCEDURE — G8427 DOCREV CUR MEDS BY ELIG CLIN: HCPCS | Performed by: FAMILY MEDICINE

## 2020-07-08 PROCEDURE — 99213 OFFICE O/P EST LOW 20 MIN: CPT | Performed by: FAMILY MEDICINE

## 2020-07-08 PROCEDURE — 81003 URINALYSIS AUTO W/O SCOPE: CPT | Performed by: FAMILY MEDICINE

## 2020-07-08 PROCEDURE — 1036F TOBACCO NON-USER: CPT | Performed by: FAMILY MEDICINE

## 2020-07-08 PROCEDURE — G8419 CALC BMI OUT NRM PARAM NOF/U: HCPCS | Performed by: FAMILY MEDICINE

## 2020-07-08 RX ORDER — NITROFURANTOIN 25; 75 MG/1; MG/1
100 CAPSULE ORAL 2 TIMES DAILY
Qty: 20 CAPSULE | Refills: 0 | Status: SHIPPED | OUTPATIENT
Start: 2020-07-08 | End: 2020-07-18

## 2020-07-08 NOTE — PROGRESS NOTES
normal.         Behavior: Behavior normal.         Impression/Plan:  1. UTI symptoms  UTI symptoms for 1 day. No systemic symptoms. Urinalysis is equivocal as she has a small amount of blood and leukocytes. Negative nitrate. We will obtain a urine culture. Start Macrobid empirically  - POCT Urinalysis No Micro (Auto)  - Culture, Urine  - nitrofurantoin, macrocrystal-monohydrate, (MACROBID) 100 MG capsule; Take 1 capsule by mouth 2 times daily for 10 days  Dispense: 20 capsule; Refill: 0    2. 22 weeks gestation of pregnancy  follows with OB      They voiced understanding. All questions answered. They agreed with treatment plan. See patient instructions for any educational materials that may have been given. Discussed use, benefit, and side effects of prescribed medications. (Please note that portions of this note may have been completed with a voice recognition program.  Efforts were made to edit the dictation but occasionally words are mis-transcribed.)    Return if symptoms worsen or fail to improve.        Electronically signed by Jerome Srinivasan MD on 7/8/2020 at 2:39 PM

## 2020-07-11 LAB
ORGANISM: ABNORMAL
URINE CULTURE, ROUTINE: ABNORMAL

## 2020-11-05 ENCOUNTER — HOSPITAL ENCOUNTER (INPATIENT)
Age: 30
LOS: 1 days | Discharge: HOME | End: 2020-11-06
Attending: OBSTETRICS & GYNECOLOGY | Admitting: OBSTETRICS & GYNECOLOGY
Payer: COMMERCIAL

## 2020-11-05 ENCOUNTER — ANESTHESIA EVENT (OUTPATIENT)
Dept: LABOR AND DELIVERY | Age: 30
End: 2020-11-05
Payer: COMMERCIAL

## 2020-11-05 ENCOUNTER — APPOINTMENT (OUTPATIENT)
Dept: LABOR AND DELIVERY | Age: 30
End: 2020-11-05
Payer: COMMERCIAL

## 2020-11-05 ENCOUNTER — ANESTHESIA (OUTPATIENT)
Dept: LABOR AND DELIVERY | Age: 30
End: 2020-11-05
Payer: COMMERCIAL

## 2020-11-05 LAB
ABO: NORMAL
AMPHETAMINE+METHAMPHETAMINE URINE SCREEN: NEGATIVE
ANTIBODY SCREEN: NORMAL
BARBITURATE QUANTITATIVE URINE: NEGATIVE
BASOPHILS # BLD: 0.3 %
BASOPHILS ABSOLUTE: 0 THOU/MM3 (ref 0–0.1)
BENZODIAZEPINE QUANTITATIVE URINE: NEGATIVE
CANNABINOID QUANTITATIVE URINE: NEGATIVE
COCAINE METABOLITE QUANTITATIVE URINE: NEGATIVE
EOSINOPHIL # BLD: 0.3 %
EOSINOPHILS ABSOLUTE: 0 THOU/MM3 (ref 0–0.4)
ERYTHROCYTE [DISTWIDTH] IN BLOOD BY AUTOMATED COUNT: 12.9 % (ref 11.5–14.5)
ERYTHROCYTE [DISTWIDTH] IN BLOOD BY AUTOMATED COUNT: 42.8 FL (ref 35–45)
HCT VFR BLD CALC: 37.9 % (ref 37–47)
HEMOGLOBIN: 12.5 GM/DL (ref 12–16)
IMMATURE GRANS (ABS): 0.19 THOU/MM3 (ref 0–0.07)
IMMATURE GRANULOCYTES: 1.9 %
LYMPHOCYTES # BLD: 17.7 %
LYMPHOCYTES ABSOLUTE: 1.8 THOU/MM3 (ref 1–4.8)
MCH RBC QN AUTO: 30.5 PG (ref 26–33)
MCHC RBC AUTO-ENTMCNC: 33 GM/DL (ref 32.2–35.5)
MCV RBC AUTO: 92.4 FL (ref 81–99)
MONOCYTES # BLD: 5.5 %
MONOCYTES ABSOLUTE: 0.6 THOU/MM3 (ref 0.4–1.3)
NUCLEATED RED BLOOD CELLS: 0 /100 WBC
OPIATES, URINE: NEGATIVE
OXYCODONE: NEGATIVE
PHENCYCLIDINE QUANTITATIVE URINE: NEGATIVE
PLATELET # BLD: 188 THOU/MM3 (ref 130–400)
PMV BLD AUTO: 9.5 FL (ref 9.4–12.4)
RBC # BLD: 4.1 MILL/MM3 (ref 4.2–5.4)
RH FACTOR: NORMAL
RPR: NONREACTIVE
SEG NEUTROPHILS: 74.3 %
SEGMENTED NEUTROPHILS ABSOLUTE COUNT: 7.4 THOU/MM3 (ref 1.8–7.7)
WBC # BLD: 10 THOU/MM3 (ref 4.8–10.8)

## 2020-11-05 PROCEDURE — 86900 BLOOD TYPING SEROLOGIC ABO: CPT

## 2020-11-05 PROCEDURE — 3E033VJ INTRODUCTION OF OTHER HORMONE INTO PERIPHERAL VEIN, PERCUTANEOUS APPROACH: ICD-10-PCS | Performed by: OBSTETRICS & GYNECOLOGY

## 2020-11-05 PROCEDURE — 86901 BLOOD TYPING SEROLOGIC RH(D): CPT

## 2020-11-05 PROCEDURE — 10907ZC DRAINAGE OF AMNIOTIC FLUID, THERAPEUTIC FROM PRODUCTS OF CONCEPTION, VIA NATURAL OR ARTIFICIAL OPENING: ICD-10-PCS | Performed by: OBSTETRICS & GYNECOLOGY

## 2020-11-05 PROCEDURE — 86850 RBC ANTIBODY SCREEN: CPT

## 2020-11-05 PROCEDURE — 6360000002 HC RX W HCPCS: Performed by: OBSTETRICS & GYNECOLOGY

## 2020-11-05 PROCEDURE — 6370000000 HC RX 637 (ALT 250 FOR IP)

## 2020-11-05 PROCEDURE — 6360000002 HC RX W HCPCS

## 2020-11-05 PROCEDURE — 2580000003 HC RX 258: Performed by: OBSTETRICS & GYNECOLOGY

## 2020-11-05 PROCEDURE — 86592 SYPHILIS TEST NON-TREP QUAL: CPT

## 2020-11-05 PROCEDURE — 1220000000 HC SEMI PRIVATE OB R&B

## 2020-11-05 PROCEDURE — 2500000003 HC RX 250 WO HCPCS: Performed by: ANESTHESIOLOGY

## 2020-11-05 PROCEDURE — 80307 DRUG TEST PRSMV CHEM ANLYZR: CPT

## 2020-11-05 PROCEDURE — 7200000001 HC VAGINAL DELIVERY

## 2020-11-05 PROCEDURE — 36415 COLL VENOUS BLD VENIPUNCTURE: CPT

## 2020-11-05 PROCEDURE — 85025 COMPLETE CBC W/AUTO DIFF WBC: CPT

## 2020-11-05 PROCEDURE — 3700000025 EPIDURAL BLOCK: Performed by: ANESTHESIOLOGY

## 2020-11-05 PROCEDURE — 6370000000 HC RX 637 (ALT 250 FOR IP): Performed by: STUDENT IN AN ORGANIZED HEALTH CARE EDUCATION/TRAINING PROGRAM

## 2020-11-05 RX ORDER — METHYLERGONOVINE MALEATE 0.2 MG/ML
200 INJECTION INTRAVENOUS
Status: ACTIVE | OUTPATIENT
Start: 2020-11-05 | End: 2020-11-05

## 2020-11-05 RX ORDER — NALBUPHINE HCL 10 MG/ML
5 AMPUL (ML) INJECTION EVERY 4 HOURS PRN
Status: DISCONTINUED | OUTPATIENT
Start: 2020-11-05 | End: 2020-11-05

## 2020-11-05 RX ORDER — IBUPROFEN 800 MG/1
800 TABLET ORAL 3 TIMES DAILY
Status: DISCONTINUED | OUTPATIENT
Start: 2020-11-05 | End: 2020-11-06 | Stop reason: HOSPADM

## 2020-11-05 RX ORDER — SODIUM CHLORIDE 0.9 % (FLUSH) 0.9 %
10 SYRINGE (ML) INJECTION PRN
Status: DISCONTINUED | OUTPATIENT
Start: 2020-11-05 | End: 2020-11-06 | Stop reason: HOSPADM

## 2020-11-05 RX ORDER — SODIUM CHLORIDE, SODIUM LACTATE, POTASSIUM CHLORIDE, CALCIUM CHLORIDE 600; 310; 30; 20 MG/100ML; MG/100ML; MG/100ML; MG/100ML
INJECTION, SOLUTION INTRAVENOUS CONTINUOUS
Status: DISCONTINUED | OUTPATIENT
Start: 2020-11-05 | End: 2020-11-06 | Stop reason: HOSPADM

## 2020-11-05 RX ORDER — LIDOCAINE HYDROCHLORIDE 10 MG/ML
30 INJECTION, SOLUTION EPIDURAL; INFILTRATION; INTRACAUDAL; PERINEURAL PRN
Status: DISCONTINUED | OUTPATIENT
Start: 2020-11-05 | End: 2020-11-05

## 2020-11-05 RX ORDER — ONDANSETRON 2 MG/ML
4 INJECTION INTRAMUSCULAR; INTRAVENOUS EVERY 6 HOURS PRN
Status: DISCONTINUED | OUTPATIENT
Start: 2020-11-05 | End: 2020-11-06 | Stop reason: HOSPADM

## 2020-11-05 RX ORDER — CARBOPROST TROMETHAMINE 250 UG/ML
250 INJECTION, SOLUTION INTRAMUSCULAR
Status: DISCONTINUED | OUTPATIENT
Start: 2020-11-05 | End: 2020-11-05 | Stop reason: SDUPTHER

## 2020-11-05 RX ORDER — FERROUS SULFATE 325(65) MG
325 TABLET ORAL
Status: DISCONTINUED | OUTPATIENT
Start: 2020-11-06 | End: 2020-11-06 | Stop reason: HOSPADM

## 2020-11-05 RX ORDER — DOCUSATE SODIUM 100 MG/1
100 CAPSULE, LIQUID FILLED ORAL 2 TIMES DAILY PRN
Status: DISCONTINUED | OUTPATIENT
Start: 2020-11-05 | End: 2020-11-06 | Stop reason: HOSPADM

## 2020-11-05 RX ORDER — MISOPROSTOL 200 UG/1
1000 TABLET ORAL PRN
Status: DISCONTINUED | OUTPATIENT
Start: 2020-11-05 | End: 2020-11-05

## 2020-11-05 RX ORDER — ACETAMINOPHEN 325 MG/1
650 TABLET ORAL EVERY 4 HOURS PRN
Status: DISCONTINUED | OUTPATIENT
Start: 2020-11-05 | End: 2020-11-06 | Stop reason: HOSPADM

## 2020-11-05 RX ORDER — HYDROCODONE BITARTRATE AND ACETAMINOPHEN 5; 325 MG/1; MG/1
2 TABLET ORAL EVERY 4 HOURS PRN
Status: DISCONTINUED | OUTPATIENT
Start: 2020-11-05 | End: 2020-11-06 | Stop reason: HOSPADM

## 2020-11-05 RX ORDER — TERBUTALINE SULFATE 1 MG/ML
0.25 INJECTION, SOLUTION SUBCUTANEOUS
Status: DISCONTINUED | OUTPATIENT
Start: 2020-11-05 | End: 2020-11-05

## 2020-11-05 RX ORDER — ONDANSETRON 2 MG/ML
8 INJECTION INTRAMUSCULAR; INTRAVENOUS EVERY 6 HOURS PRN
Status: DISCONTINUED | OUTPATIENT
Start: 2020-11-05 | End: 2020-11-05

## 2020-11-05 RX ORDER — HYDROCODONE BITARTRATE AND ACETAMINOPHEN 5; 325 MG/1; MG/1
1 TABLET ORAL EVERY 4 HOURS PRN
Status: DISCONTINUED | OUTPATIENT
Start: 2020-11-05 | End: 2020-11-06 | Stop reason: HOSPADM

## 2020-11-05 RX ORDER — SEVOFLURANE 250 ML/250ML
1 LIQUID RESPIRATORY (INHALATION) CONTINUOUS PRN
Status: DISCONTINUED | OUTPATIENT
Start: 2020-11-05 | End: 2020-11-05

## 2020-11-05 RX ORDER — MORPHINE SULFATE 2 MG/ML
2 INJECTION, SOLUTION INTRAMUSCULAR; INTRAVENOUS
Status: DISCONTINUED | OUTPATIENT
Start: 2020-11-05 | End: 2020-11-06 | Stop reason: HOSPADM

## 2020-11-05 RX ORDER — NALOXONE HYDROCHLORIDE 0.4 MG/ML
0.4 INJECTION, SOLUTION INTRAMUSCULAR; INTRAVENOUS; SUBCUTANEOUS PRN
Status: DISCONTINUED | OUTPATIENT
Start: 2020-11-05 | End: 2020-11-05

## 2020-11-05 RX ORDER — DIPHENHYDRAMINE HYDROCHLORIDE 50 MG/ML
25 INJECTION INTRAMUSCULAR; INTRAVENOUS EVERY 4 HOURS PRN
Status: DISCONTINUED | OUTPATIENT
Start: 2020-11-05 | End: 2020-11-05

## 2020-11-05 RX ORDER — METHYLERGONOVINE MALEATE 0.2 MG/ML
200 INJECTION INTRAVENOUS PRN
Status: DISCONTINUED | OUTPATIENT
Start: 2020-11-05 | End: 2020-11-05

## 2020-11-05 RX ORDER — ONDANSETRON 2 MG/ML
4 INJECTION INTRAMUSCULAR; INTRAVENOUS EVERY 6 HOURS PRN
Status: DISCONTINUED | OUTPATIENT
Start: 2020-11-05 | End: 2020-11-05

## 2020-11-05 RX ORDER — IBUPROFEN 800 MG/1
TABLET ORAL
Status: COMPLETED
Start: 2020-11-05 | End: 2020-11-05

## 2020-11-05 RX ORDER — SODIUM CHLORIDE 0.9 % (FLUSH) 0.9 %
10 SYRINGE (ML) INJECTION EVERY 12 HOURS SCHEDULED
Status: DISCONTINUED | OUTPATIENT
Start: 2020-11-05 | End: 2020-11-06 | Stop reason: HOSPADM

## 2020-11-05 RX ORDER — CARBOPROST TROMETHAMINE 250 UG/ML
250 INJECTION, SOLUTION INTRAMUSCULAR PRN
Status: DISCONTINUED | OUTPATIENT
Start: 2020-11-05 | End: 2020-11-06 | Stop reason: HOSPADM

## 2020-11-05 RX ORDER — IBUPROFEN 800 MG/1
800 TABLET ORAL EVERY 8 HOURS PRN
Status: DISCONTINUED | OUTPATIENT
Start: 2020-11-05 | End: 2020-11-05

## 2020-11-05 RX ORDER — MORPHINE SULFATE 4 MG/ML
4 INJECTION, SOLUTION INTRAMUSCULAR; INTRAVENOUS
Status: DISCONTINUED | OUTPATIENT
Start: 2020-11-05 | End: 2020-11-05

## 2020-11-05 RX ORDER — MORPHINE SULFATE 2 MG/ML
2 INJECTION, SOLUTION INTRAMUSCULAR; INTRAVENOUS
Status: DISCONTINUED | OUTPATIENT
Start: 2020-11-05 | End: 2020-11-05

## 2020-11-05 RX ORDER — MODIFIED LANOLIN
OINTMENT (GRAM) TOPICAL PRN
Status: DISCONTINUED | OUTPATIENT
Start: 2020-11-05 | End: 2020-11-06 | Stop reason: HOSPADM

## 2020-11-05 RX ORDER — SODIUM CHLORIDE, SODIUM LACTATE, POTASSIUM CHLORIDE, CALCIUM CHLORIDE 600; 310; 30; 20 MG/100ML; MG/100ML; MG/100ML; MG/100ML
INJECTION, SOLUTION INTRAVENOUS CONTINUOUS
Status: DISCONTINUED | OUTPATIENT
Start: 2020-11-05 | End: 2020-11-05

## 2020-11-05 RX ORDER — ONDANSETRON 4 MG/1
8 TABLET, ORALLY DISINTEGRATING ORAL EVERY 8 HOURS PRN
Status: DISCONTINUED | OUTPATIENT
Start: 2020-11-05 | End: 2020-11-06 | Stop reason: HOSPADM

## 2020-11-05 RX ORDER — BUTORPHANOL TARTRATE 1 MG/ML
1 INJECTION, SOLUTION INTRAMUSCULAR; INTRAVENOUS
Status: DISCONTINUED | OUTPATIENT
Start: 2020-11-05 | End: 2020-11-05

## 2020-11-05 RX ORDER — MISOPROSTOL 200 UG/1
800 TABLET ORAL
Status: ACTIVE | OUTPATIENT
Start: 2020-11-05 | End: 2020-11-05

## 2020-11-05 RX ORDER — ACETAMINOPHEN 325 MG/1
650 TABLET ORAL EVERY 4 HOURS PRN
Status: DISCONTINUED | OUTPATIENT
Start: 2020-11-05 | End: 2020-11-05

## 2020-11-05 RX ORDER — MORPHINE SULFATE 4 MG/ML
4 INJECTION, SOLUTION INTRAMUSCULAR; INTRAVENOUS
Status: DISCONTINUED | OUTPATIENT
Start: 2020-11-05 | End: 2020-11-06 | Stop reason: HOSPADM

## 2020-11-05 RX ADMIN — IBUPROFEN 800 MG: 800 TABLET, FILM COATED ORAL at 11:38

## 2020-11-05 RX ADMIN — IBUPROFEN 800 MG: 800 TABLET, FILM COATED ORAL at 19:46

## 2020-11-05 RX ADMIN — Medication 1 MILLI-UNITS/MIN: at 07:38

## 2020-11-05 RX ADMIN — ONDANSETRON 8 MG: 2 INJECTION INTRAMUSCULAR; INTRAVENOUS at 09:34

## 2020-11-05 RX ADMIN — Medication 18 ML/HR: at 08:37

## 2020-11-05 RX ADMIN — DOCUSATE SODIUM 100 MG: 100 CAPSULE, LIQUID FILLED ORAL at 19:46

## 2020-11-05 RX ADMIN — SODIUM CHLORIDE, POTASSIUM CHLORIDE, SODIUM LACTATE AND CALCIUM CHLORIDE: 600; 310; 30; 20 INJECTION, SOLUTION INTRAVENOUS at 08:05

## 2020-11-05 RX ADMIN — SODIUM CHLORIDE, POTASSIUM CHLORIDE, SODIUM LACTATE AND CALCIUM CHLORIDE: 600; 310; 30; 20 INJECTION, SOLUTION INTRAVENOUS at 06:45

## 2020-11-05 RX ADMIN — SODIUM CHLORIDE, POTASSIUM CHLORIDE, SODIUM LACTATE AND CALCIUM CHLORIDE: 600; 310; 30; 20 INJECTION, SOLUTION INTRAVENOUS at 09:34

## 2020-11-05 ASSESSMENT — PAIN SCALES - GENERAL
PAINLEVEL_OUTOF10: 1
PAINLEVEL_OUTOF10: 0

## 2020-11-05 ASSESSMENT — PAIN DESCRIPTION - DESCRIPTORS
DESCRIPTORS: ACHING;CRAMPING
DESCRIPTORS: ACHING;CRAMPING

## 2020-11-05 NOTE — FLOWSHEET NOTE
Admitted mom and baby to room [id number] via wheelchair. Report taken from LELAND Jiménez rn iv of lactated ringers continues to infuse without difficulty of lac. Ringers at 125 ml/hr along with pitocin at 50 ml/hr . Orientation to room completed with verbal understanding . Instructed mom to notify me of need to void times one more. Assessment completed .

## 2020-11-05 NOTE — PLAN OF CARE
Problem: Discharge Planning:  Goal: Discharged to appropriate level of care  Description: Discharged to appropriate level of care  Outcome: Ongoing     Problem: Constipation:  Goal: Bowel elimination is within specified parameters  Description: Bowel elimination is within specified parameters  Outcome: Ongoing     Problem: Fluid Volume - Imbalance:  Goal: Absence of imbalanced fluid volume signs and symptoms  Description: Absence of imbalanced fluid volume signs and symptoms  Outcome: Ongoing     Problem: Fluid Volume - Imbalance:  Goal: Absence of postpartum hemorrhage signs and symptoms  Description: Absence of postpartum hemorrhage signs and symptoms  Outcome: Ongoing     Problem: Infection - Risk of, Puerperal Infection:  Goal: Will show no infection signs and symptoms  Description: Will show no infection signs and symptoms  Outcome: Ongoing     Problem: Mood - Altered:  Goal: Mood stable  Description: Mood stable  Outcome: Ongoing     Problem: Falls - Risk of:  Goal: Will remain free from falls  Description: Will remain free from falls  Outcome: Ongoing     Problem: Falls - Risk of:  Goal: Absence of physical injury  Description: Absence of physical injury  Outcome: Ongoing     Problem: Pain:  Goal: Pain level will decrease  Description: Pain level will decrease  11/5/2020 1103 by Zeynep Mcintosh RN  Outcome: Completed     Problem: Pain:  Goal: Pain level will decrease  Description: Pain level will decrease  11/5/2020 1103 by Zeynep Mcintosh RN  Outcome: Completed   Plan of care continues

## 2020-11-05 NOTE — FLOWSHEET NOTE
External fetal monitor applied to soft nontender abdomen, plan of care discussed, pt uncomfortable with contractions, stopping to breathe through them

## 2020-11-05 NOTE — ANESTHESIA PROCEDURE NOTES
Epidural Block    Patient location during procedure: OB  Start time: 11/5/2020 8:26 AM  End time: 11/5/2020 8:38 AM  Reason for block: labor epidural  Staffing  Anesthesiologist: China Michaels DO  Performed: anesthesiologist   Preanesthetic Checklist  Completed: patient identified, site marked, surgical consent, pre-op evaluation, timeout performed, IV checked, risks and benefits discussed, monitors and equipment checked, anesthesia consent given, oxygen available and patient being monitored  Epidural  Patient position: sitting  Prep: ChloraPrep  Patient monitoring: continuous pulse ox and frequent blood pressure checks  Approach: midline  Location: lumbar (1-5)  Injection technique: JOSÉ saline  Provider prep: mask and sterile gloves  Needle  Needle type: Tuohy   Needle gauge: 18 G  Needle length: 3.5 in  Needle insertion depth: 6 cm  Catheter type: end hole  Catheter size: 19 G  Catheter at skin depth: 10 cm  Test dose: negative  Assessment  Sensory level: T10  Hemodynamics: stable  Attempts: 1

## 2020-11-05 NOTE — L&D DELIVERY NOTE
Department of Obstetrics and Gynecology  Spontaneous Vaginal Delivery Note      Pt Name: Carlie Bhatti  MRN: [id number] [place] #: [de-identified]  YOB: 1990  Procedure Performed By: Alem Arciniega MD and Heather Stevens MD      Pre-operative Diagnosis:  Term pregnancy, Spontaneous labor, Single fetus and Uncomplicated pregnancy  Post-operative Diagnosis: Same, delivered. Procedure:  Spontaneous vaginal delivery  Surgeon:  Alem Arciniega MD and Heather Stevens MD    Information for the patient's :  Larry Quincys [[id number]]          Anesthesia:  epidural anesthesia  Estimated blood loss:  300ml  Specimen:  Placenta not sent to pathology   Complications:  none  Condition:  infant stable to general nursery and mother stable    Details of Procedure: The patient is a 30 y.o. female at 38w3d   OB History        3    Para   1    Term   1            AB   1    Living   1       SAB   1    TAB        Ectopic        Molar        Multiple   0    Live Births   1             who was admitted for active phase labor. She received the following interventions: IV Pitocin augmentation. The patient progressed well, did receive an epidural, became complete and started to push. She was placed in the dorsal lithotomy position and prepped. She delivered the vertex over an intact perineum . The rest of the infant delivered atraumatically, placed on mother abdomen. The nurse attended the baby. The cord was clamped and cut after a minute. The baby stayed with mom and skin to skin was implemented. The placenta delivered intact, whole and that the umbilical cord had three vessels noted. The perineum and vagina were explored and a no lacerations were found. A vaginal sweep was performed and there were no retained products and 0 needles were taken off the field. The count was correct.     Delivery Summary:  Information for the patient's :  Carline Sutton Dasha Karsten [[id number]] Labor & Delivery Summary  Dilation Complete Date: 11/05/20  Dilation Complete Time: 1030       PMH:  Past Medical History:   Diagnosis Date    OCD (obsessive compulsive disorder)     PONV (postoperative nausea and vomiting)        Poornima Arana 11/5/2020 11:04 AM

## 2020-11-05 NOTE — PLAN OF CARE
Problem: Pain:  Goal: Pain level will decrease  Description: Pain level will decrease  11/5/2020 0723 by Imer Loya RN  Outcome: Ongoing   Pain is a 5, pain goal 7, planning labor epidural  Problem: Anxiety:  Goal: Level of anxiety will decrease  Description: Level of anxiety will decrease  Outcome: Ongoing   calm  Problem: Breathing Pattern - Ineffective:  Goal: Able to breathe comfortably  Description: Able to breathe comfortably  Outcome: Ongoing   Respirations regular and easy  Problem: Fluid Volume - Imbalance:  Goal: Absence of imbalanced fluid volume signs and symptoms  Description: Absence of imbalanced fluid volume signs and symptoms  Outcome: Ongoing   stable  Problem: Infection - Intrapartum Infection:  Goal: Will show no infection signs and symptoms  Description: Will show no infection signs and symptoms  Outcome: Ongoing   afebrile  Problem: Labor Process - Prolonged:  Goal: Uterine contractions within specified parameters  Description: Uterine contractions within specified parameters  Outcome: Ongoing   Michael every 3-5 minutes  Problem: Tissue Perfusion - Uteroplacental, Altered:  Goal: Absence of abnormal fetal heart rate pattern  Description: Absence of abnormal fetal heart rate pattern  Outcome: Ongoing   Reactive fht's  Problem: Urinary Retention:  Goal: Urinary elimination within specified parameters  Description: Urinary elimination within specified parameters  Outcome: Ongoing   Bathroom priviliges  Problem: Falls - Risk of:  Goal: Will remain free from falls  Description: Will remain free from falls  Outcome: Ongoing   No falls  Care plan reviewed with patient and Haro Courts. Patient and Haro Courts verbalize understanding of the plan of care and contribute to goal setting.

## 2020-11-05 NOTE — ANESTHESIA PRE PROCEDURE
Department of Anesthesiology  Preprocedure Note       Name:  Lucy Ferguson   Age:  30 y.o.  :  1990                                          MRN:  [id number]         Date:  2020      Surgeon: * No surgeons listed *    Procedure: * No procedures listed *    Medications prior to admission:   Prior to Admission medications    Medication Sig Start Date End Date Taking?  Authorizing Provider   Prenatal MV-Min-Fe Fum-FA-DHA (PRENATAL 1 PO) Take 1 tablet by mouth   Yes Historical Provider, MD       Current medications:    Current Facility-Administered Medications   Medication Dose Route Frequency Provider Last Rate Last Dose    lactated ringers infusion   Intravenous Continuous Marla Fletcher  mL/hr at 20 0805      lidocaine PF 1 % injection 30 mL  30 mL Other PRN Marla Fletcher MD        butorphanol (STADOL) injection 1 mg  1 mg Intravenous Q1H PRN Marla Fletcher MD        nitrous oxide 50% inhalation 1 each  1 each Inhalation Continuous PRN Marla Fletcher MD        acetaminophen (TYLENOL) tablet 650 mg  650 mg Oral Q4H PRN Marla Fletcher MD        ibuprofen (ADVIL;MOTRIN) tablet 800 mg  800 mg Oral Q8H PRN Marla Fletcher MD        morphine (PF) injection 2 mg  2 mg Intravenous Q2H PRN Marla Fletcher MD        Or    morphine injection 4 mg  4 mg Intravenous Q2H PRN Marla Fletcher MD        oxytocin (PITOCIN) 30 units in 500 mL infusion  1 bhupinder-units/min Intravenous Continuous Marla Fletcher MD 1 mL/hr at 20 0738 1 bhupinder-units/min at 20 0738    diphenhydrAMINE (BENADRYL) injection 25 mg  25 mg Intravenous Q4H PRN Marla Fletchre MD        ondansetron [facility]) injection 8 mg  8 mg Intravenous Q6H PRN Marla Fletcher MD        terbutaline (BRETHINE) injection 0.25 mg  0.25 mg Subcutaneous Once PRN Marla Fletcher MD        oxytocin (PITOCIN) 10 unit bolus from the bag  10 Units Intravenous PRN Nick MAHAN Currently     Alcohol/week: 0.0 standard drinks     Comment: occ - none since current pregnancy                                 Counseling given: Not Answered      Vital Signs (Current):   Vitals:    11/05/20 0620 11/05/20 0737 11/05/20 0833   BP: 125/77 115/68 123/72   Pulse: 100 95 86   Resp: 18 18 18   Temp: 96.9 °F (36.1 °C)     TempSrc: Oral     SpO2: 98%  100%   Weight: 213 lb (96.6 kg)     Height: 5' 10\" (1.778 m)                                                BP Readings from Last 3 Encounters:   11/05/20 123/72   07/08/20 104/62   02/20/20 131/84       NPO Status: Time of last liquid consumption: 0530                        Time of last solid consumption: 0530                        Date of last liquid consumption: 11/05/20                        Date of last solid food consumption: 11/05/20    BMI:   Wt Readings from Last 3 Encounters:   11/05/20 213 lb (96.6 kg)   07/08/20 191 lb 3.2 oz (86.7 kg)   02/20/20 174 lb (78.9 kg)     Body mass index is 30.56 kg/m². CBC:   Lab Results   Component Value Date    WBC 10.0 11/05/2020    RBC 4.10 11/05/2020    HGB 12.5 11/05/2020    HCT 37.9 11/05/2020    MCV 92.4 11/05/2020    RDW 12.5 01/05/2013     11/05/2020       CMP:   Lab Results   Component Value Date     01/05/2013    K 4.3 01/05/2013     01/05/2013    CO2 26 01/05/2013    BUN 10 01/05/2013    CREATININE 0.7 01/05/2013    LABGLOM >90 01/06/2013    GLUCOSE 98 01/05/2013    CALCIUM 9.4 01/05/2013       POC Tests: No results for input(s): POCGLU, POCNA, POCK, POCCL, POCBUN, POCHEMO, POCHCT in the last 72 hours.     Coags: No results found for: PROTIME, INR, APTT    HCG (If Applicable):   Lab Results   Component Value Date    PREGTESTUR NEGATIVE 05/07/2012    PREGSERUM NEGATIVE 01/05/2013        ABGs: No results found for: PHART, PO2ART, GXR6QVA, LFG4MEW, BEART, U9FFBPZK     Type & Screen (If Applicable):  Lab Results   Component Value Date    LAB POS 04/18/2020       Drug/Infectious

## 2020-11-06 VITALS
HEIGHT: 70 IN | DIASTOLIC BLOOD PRESSURE: 81 MMHG | OXYGEN SATURATION: 100 % | SYSTOLIC BLOOD PRESSURE: 119 MMHG | WEIGHT: 213 LBS | RESPIRATION RATE: 16 BRPM | BODY MASS INDEX: 30.49 KG/M2 | HEART RATE: 72 BPM | TEMPERATURE: 97.9 F

## 2020-11-06 PROCEDURE — 6370000000 HC RX 637 (ALT 250 FOR IP): Performed by: STUDENT IN AN ORGANIZED HEALTH CARE EDUCATION/TRAINING PROGRAM

## 2020-11-06 RX ORDER — PSEUDOEPHEDRINE HCL 30 MG
100 TABLET ORAL 2 TIMES DAILY PRN
COMMUNITY
Start: 2020-11-06 | End: 2021-06-23

## 2020-11-06 RX ORDER — IBUPROFEN 200 MG
800 TABLET ORAL EVERY 8 HOURS PRN
COMMUNITY
Start: 2020-11-06 | End: 2021-06-23

## 2020-11-06 RX ADMIN — IBUPROFEN 800 MG: 800 TABLET, FILM COATED ORAL at 06:32

## 2020-11-06 RX ADMIN — DOCUSATE SODIUM 100 MG: 100 CAPSULE, LIQUID FILLED ORAL at 08:18

## 2020-11-06 ASSESSMENT — PAIN SCALES - GENERAL
PAINLEVEL_OUTOF10: 0
PAINLEVEL_OUTOF10: 0

## 2020-11-06 NOTE — PROGRESS NOTES
Department of Obstetrics and Gynecology  Labor and Delivery  Attending Post Partum Progress Note    SUBJECTIVE:  PPD# 1    OBJECTIVE: Doing well     Vitals:  BP (!) 98/56   Pulse 81   Temp 96.3 °F (35.7 °C) (Oral)   Resp 18   Ht 5' 10\" (1.778 m)   Wt 213 lb (96.6 kg)   LMP 02/03/2020   SpO2 98%   Breastfeeding Unknown   BMI 30.56 kg/m²     ABDOMEN:  Soft, NT, ND, fundus firm     DATA:    Hemoglobin:   Lab Results   Component Value Date    HGB 12.5 11/05/2020    HCT 37.9 11/05/2020       ASSESSMENT & PLAN:    PPD#1  - Doing well  - D/C home today or tomorrow    Char Alfred

## 2020-11-06 NOTE — DISCHARGE SUMMARY
Vaginal Delivery Discharge Summary    Gestational Age:39w3d    Antepartum complications: none    Date of Delivery: 2020     Condition: Good     Type of Delivery: Vaginal     [place] Data  Information for the patient's :  [place] Girl Chay Alanis [[id number]]   female   Birth Weight: 9 lb 0.8 oz (4.105 kg)       Labs: CBC   Lab Results   Component Value Date    HGB 12.5 2020    HCT 37.9 2020        Intrapartum complications: None    Postpartum complications: none    The patient is ambulating well. The patient is tolerating a normal diet.       Discharge Date: 2020    Plan:   Follow up in the office in 6 weeks    [address] [place] [address]

## 2020-11-06 NOTE — LACTATION NOTE
Discussed with pt. Feeding cues, burping infant before feeds, and nipple to nose latching. Encouraged pt. To call lactation for assistance at next feed. Will continue to follow up with pt. PRN.

## 2020-11-06 NOTE — PLAN OF CARE
Problem: Discharge Planning:  Goal: Discharged to appropriate level of care  Description: Discharged to appropriate level of care  11/6/2020 0056 by Lorena Veras RN  Outcome: Ongoing  Note: Kory Serve in a row      Problem: Constipation:  Goal: Bowel elimination is within specified parameters  Description: Bowel elimination is within specified parameters  11/6/2020 0056 by Lorena Veras RN  Outcome: Ongoing  Note: Passing gas      Problem: Fluid Volume - Imbalance:  Goal: Absence of imbalanced fluid volume signs and symptoms  Description: Absence of imbalanced fluid volume signs and symptoms  11/6/2020 0056 by Lorena Veras RN  Outcome: Ongoing  Note: Lochia WNL      Problem: Fluid Volume - Imbalance:  Goal: Absence of postpartum hemorrhage signs and symptoms  Description: Absence of postpartum hemorrhage signs and symptoms  11/6/2020 0056 by Lorena Veras RN  Outcome: Ongoing  Note: WNL lochia      Problem: Infection - Risk of, Puerperal Infection:  Goal: Will show no infection signs and symptoms  Description: Will show no infection signs and symptoms  11/6/2020 0056 by Lorena Veras RN  Outcome: Ongoing  Note: No signs of infection      Problem: Mood - Altered:  Goal: Mood stable  Description: Mood stable  11/6/2020 0056 by Lorena Veras RN  Outcome: Ongoing  Note: Pt pleasant      Problem: Pain - Acute:  Goal: Pain level will decrease  Description: Pain level will decrease  11/6/2020 0056 by Lorena Veras RN  Outcome: Ongoing  Note: Pain controlled with po meds. Discussed ice for perineal pain and/or incisional pain or the use of warm blanket/heating pad for uterine cramps. Pt states her pain goal 4/10 has been met.       Problem: Falls - Risk of:  Goal: Will remain free from falls  Description: Will remain free from falls  11/6/2020 0056 by Lorena Veras RN  Outcome: Ongoing  Note: Gait steady to and from bed      Problem: Falls - Risk of:  Goal: Absence of physical injury  Description: Absence of physical injury  11/6/2020 0056 by Rodriguez Gatica RN  Outcome: Ongoing  Note: Gait steady to and from bed    Plan of care discussed with mother and she contributes to goal setting and voices understanding of plan of care.

## 2020-11-07 NOTE — ANESTHESIA POSTPROCEDURE EVALUATION
Department of Anesthesiology  Postprocedure Note    Patient: Estrella Pablo  MRN: [id number]  YOB: 1990  Date of evaluation: 11/7/2020  Time:  5:15 PM     Procedure Summary     Date:  11/05/20 Room / Location:      Anesthesia Start:  0826 Anesthesia Stop:  1046    Procedure:  Labor Analgesia Diagnosis:      Scheduled Providers:   Responsible Provider:  Genna Casper DO    Anesthesia Type:  epidural ASA Status:  2          Anesthesia Type: No value filed. Mk Phase I: Mk Score: 10    Mk Phase II: Mk Score: 10    Last vitals: Reviewed and per EMR flowsheets.        Anesthesia Post Evaluation    Patient location during evaluation: floor  Patient participation: complete - patient participated  Level of consciousness: awake  Airway patency: patent  Nausea & Vomiting: no vomiting and no nausea  Complications: no  Cardiovascular status: hemodynamically stable  Respiratory status: acceptable  Hydration status: stable  Comments: DELAYED ENTRY

## 2021-05-28 DIAGNOSIS — K42.9 UMBILICAL HERNIA WITHOUT OBSTRUCTION AND WITHOUT GANGRENE: Primary | ICD-10-CM

## 2021-06-23 ENCOUNTER — OFFICE VISIT (OUTPATIENT)
Dept: SURGERY | Age: 31
End: 2021-06-23
Payer: COMMERCIAL

## 2021-06-23 VITALS
RESPIRATION RATE: 18 BRPM | TEMPERATURE: 97.3 F | OXYGEN SATURATION: 98 % | SYSTOLIC BLOOD PRESSURE: 106 MMHG | HEART RATE: 89 BPM | BODY MASS INDEX: 25.34 KG/M2 | HEIGHT: 70 IN | DIASTOLIC BLOOD PRESSURE: 74 MMHG | WEIGHT: 177 LBS

## 2021-06-23 DIAGNOSIS — M62.08 DIASTASIS RECTI: ICD-10-CM

## 2021-06-23 DIAGNOSIS — K42.9 UMBILICAL HERNIA WITHOUT OBSTRUCTION AND WITHOUT GANGRENE: Primary | ICD-10-CM

## 2021-06-23 PROCEDURE — 99203 OFFICE O/P NEW LOW 30 MIN: CPT | Performed by: SURGERY

## 2021-06-23 RX ORDER — NORGESTIMATE AND ETHINYL ESTRADIOL 0.25-0.035
1 KIT ORAL DAILY
COMMUNITY
Start: 2021-06-04

## 2021-06-23 NOTE — LETTER
[facility]  [place] [address] [address] [facility] [address]  Phone: [phone]  Fax: [phone]           Tawnya Womack MD      June 25, 2021     Patient: Murray Nicolas   MR Number: [id number]   YOB: 1990   Date of Visit: 6/23/2021       Dear Dr. Lynn Suther[address]: Thank you for referring Loc Green to me for evaluation/treatment. Below are the relevant portions of my assessment and plan of care. ASSESSMENT:  1. Incarcerated umbilical hernia  2. Diastases recti    PLAN:  1. Schedule Mag[facility] for robotic possible open umbilical hernia repair with mesh. 2. She will undergo pre-operative clearance per anesthesia guidelines with risk factors listed under the past medical history diagnosis & problem list.  3. The risks, benefits and alternatives were discussed with Cholo Trinidad including non-operative management. The pros and cons of robotic, laparoscopic and open techniques were discussed. The pros and cons of mesh insertion were discussed. All questions answered. She understands and wishes to proceed with surgical intervention. 4. Restrictions discussed with Cholo Trinidad and she expresses understanding. 5. She is advised to call back directly if there are further questions/concerns, or if her symptoms worsen prior to surgery. If you have questions, please do not hesitate to call me. I look forward to following Cholo Trinidad along with you.     Sincerely,        Tawnya Womack MD

## 2021-06-24 ENCOUNTER — TELEPHONE (OUTPATIENT)
Dept: SURGERY | Age: 31
End: 2021-06-24

## 2021-06-24 ENCOUNTER — PREP FOR PROCEDURE (OUTPATIENT)
Dept: SURGERY | Age: 31
End: 2021-06-24

## 2021-06-24 RX ORDER — SODIUM CHLORIDE 9 MG/ML
INJECTION, SOLUTION INTRAVENOUS CONTINUOUS
Status: CANCELLED | OUTPATIENT
Start: 2021-06-24

## 2021-06-24 NOTE — TELEPHONE ENCOUNTER
Pt called in ready to schedule surgery. Surgery scheduled 7/22, arrive at 8:30 am to [address]. [facility] 2nd floor registration desk, NPO after midnight, bring a , shower with antibacterial soap morning of surgery, no jewelry or piercings.   Pt voiced understanding

## 2021-06-25 ASSESSMENT — ENCOUNTER SYMPTOMS
ABDOMINAL DISTENTION: 0
EYE ITCHING: 0
COLOR CHANGE: 0
BLOOD IN STOOL: 0
COUGH: 0
EYE REDNESS: 0
ABDOMINAL PAIN: 1
DIARRHEA: 0
BACK PAIN: 0
CONSTIPATION: 0
RHINORRHEA: 0
TROUBLE SWALLOWING: 0
VOMITING: 0
FACIAL SWELLING: 0
APNEA: 0
RECTAL PAIN: 0
STRIDOR: 0
CHEST TIGHTNESS: 0
NAUSEA: 0
SORE THROAT: 0
EYE PAIN: 0
SHORTNESS OF BREATH: 0
PHOTOPHOBIA: 0
ALLERGIC/IMMUNOLOGIC NEGATIVE: 1
CHOKING: 0
VOICE CHANGE: 0
ANAL BLEEDING: 0
SINUS PRESSURE: 0
WHEEZING: 0
EYE DISCHARGE: 0

## 2021-06-25 NOTE — PROGRESS NOTES
Alfonzo Subramanian (:  1990)     ASSESSMENT:  1. Incarcerated umbilical hernia  2. Diastases recti    PLAN:  1. Schedule [facility] for robotic possible open umbilical hernia repair with mesh. 2. She will undergo pre-operative clearance per anesthesia guidelines with risk factors listed under the past medical history diagnosis & problem list.  3. The risks, benefits and alternatives were discussed with Jenni Shirleyalvina including non-operative management. The pros and cons of robotic, laparoscopic and open techniques were discussed. The pros and cons of mesh insertion were discussed. All questions answered. She understands and wishes to proceed with surgical intervention. 4. Restrictions discussed with Jenette Phalen and she expresses understanding. 5. She is advised to call back directly if there are further questions/concerns, or if her symptoms worsen prior to surgery. SUBJECTIVE/OBJECTIVE:    Chief Complaint   Patient presents with    Surgical Consult     New patient-referred by Dr Strong Primes hernia     HPI  Jenette Phalen is a 71-year-old female who presents for initial evaluation secondary to a incarcerated umbilical hernia. She has had this now for several months. Is gradually worsened over the last pregnancy. She has 2 children at this time. Mild to moderate discomfort. Worse with increased activity, lifting and bending over. Worsens when she is very active throughout the day. Improved with rest and lying down. No skin changes. Also has some bulging superior and inferior to the umbilical hernia. Found to have had diastases recti. Does not give much discomfort around this area. No significant previous abdominal surgery that she is aware of. Tolerating diet. No nausea or vomiting. Normal bowel function. No hematochezia or melena. No unexplained weight loss. No fever, chills or sweats.     Review of Systems   Constitutional: Negative for activity change, appetite change, chills, diaphoresis, fatigue, fever and unexpected weight change. HENT: Negative for congestion, dental problem, drooling, ear discharge, ear pain, facial swelling, hearing loss, mouth sores, nosebleeds, postnasal drip, rhinorrhea, sinus pressure, sneezing, sore throat, tinnitus, trouble swallowing and voice change. Eyes: Negative for photophobia, pain, discharge, redness, itching and visual disturbance. Respiratory: Negative for apnea, cough, choking, chest tightness, shortness of breath, wheezing and stridor. Cardiovascular: Negative for chest pain, palpitations and leg swelling. Gastrointestinal: Positive for abdominal pain. Negative for abdominal distention, anal bleeding, blood in stool, constipation, diarrhea, nausea, rectal pain and vomiting. Endocrine: Negative. Genitourinary: Negative for decreased urine volume, difficulty urinating, dyspareunia, dysuria, enuresis, flank pain, frequency, genital sores, hematuria, menstrual problem, pelvic pain, urgency, vaginal bleeding, vaginal discharge and vaginal pain. Musculoskeletal: Negative for arthralgias, back pain, gait problem, joint swelling, myalgias, neck pain and neck stiffness. Skin: Negative for color change, pallor, rash and wound. Allergic/Immunologic: Negative. Neurological: Negative for dizziness, tremors, seizures, syncope, facial asymmetry, speech difficulty, weakness, light-headedness, numbness and headaches. Hematological: Negative for adenopathy. Does not bruise/bleed easily. Psychiatric/Behavioral: Negative for agitation, behavioral problems, confusion, decreased concentration, dysphoric mood, hallucinations, self-injury, sleep disturbance and suicidal ideas. The patient is not nervous/anxious and is not hyperactive.         Past Medical History:   Diagnosis Date    OCD (obsessive compulsive disorder)     PONV (postoperative nausea and vomiting)        Past Surgical History:   Procedure Laterality Date    ADENOIDECTOMY      [place] INDUCED ABORTN Organization Meetings:     Marital Status:    Intimate Partner Violence:     Fear of Current or Ex-Partner:     Emotionally Abused:     Physically Abused:     Sexually Abused:      Vitals:    06/23/21 1318   BP: 106/74   Site: Left Upper Arm   Position: Sitting   Cuff Size: Medium Adult   Pulse: 89   Resp: 18   Temp: 97.3 °F (36.3 °C)   TempSrc: Temporal   SpO2: 98%   Weight: 177 lb (80.3 kg)   Height: 5' 10\" (1.778 m)     Body mass index is 25.4 kg/m². Wt Readings from Last 3 Encounters:   06/23/21 177 lb (80.3 kg)   11/05/20 213 lb (96.6 kg)   07/08/20 191 lb 3.2 oz (86.7 kg)     Physical Exam  Vitals reviewed. Constitutional:       General: She is not in acute distress. Appearance: She is well-developed. She is not diaphoretic. HENT:      Head: Normocephalic and atraumatic. Right Ear: External ear normal.      Left Ear: External ear normal.      Nose: Nose normal.   Eyes:      General: No scleral icterus. Right eye: No discharge. Left eye: No discharge. Conjunctiva/sclera: Conjunctivae normal.   Cardiovascular:      Rate and Rhythm: Normal rate and regular rhythm. Heart sounds: Normal heart sounds. Pulmonary:      Effort: Pulmonary effort is normal. No respiratory distress. Breath sounds: Normal breath sounds. No wheezing or rales. Chest:      Chest wall: No tenderness. Abdominal:      General: Bowel sounds are normal. There is no distension. Palpations: Abdomen is soft. There is no mass. Tenderness: There is abdominal tenderness in the periumbilical area. There is no guarding or rebound. Hernia: A hernia is present. Hernia is present in the umbilical area. Musculoskeletal:         General: No tenderness. Normal range of motion. Cervical back: Normal range of motion and neck supple. Skin:     General: Skin is warm and dry. Coloration: Skin is not pale. Findings: No erythema or rash.    Neurological:      Mental Status: She is alert and oriented to person, place, and time. Cranial Nerves: No cranial nerve deficit. Psychiatric:         Behavior: Behavior normal.         Thought Content: Thought content normal.         Judgment: Judgment normal.       Lab Results   Component Value Date     01/05/2013    K 4.3 01/05/2013     01/05/2013    CO2 26 01/05/2013     Lab Results   Component Value Date    CREATININE 0.7 01/05/2013     Lab Results   Component Value Date    WBC 10.0 11/05/2020    HGB 12.5 11/05/2020    HCT 37.9 11/05/2020    MCV 92.4 11/05/2020     11/05/2020     No results found for: ALT, AST, GGT, ALKPHOS, BILITOT    Imaging - none    Patient Active Problem List   Diagnosis    OCD (obsessive compulsive disorder)    Recurrent cystitis    Hypotonic bladder    S/P D&C (status post dilation and curettage)    Vaginal delivery    Spontaneous vaginal delivery     An electronic signature was used to authenticate this note.     --Nora Hassan MD

## 2021-07-16 NOTE — H&P
Loreto Subramanian (:  1990)      ASSESSMENT:  1. Incarcerated umbilical hernia  2. Diastases recti     PLAN:  1. Schedule [facility] for robotic possible open umbilical hernia repair with mesh. 2. She will undergo pre-operative clearance per anesthesia guidelines with risk factors listed under the past medical history diagnosis & problem list.  3. The risks, benefits and alternatives were discussed with [facility] including non-operative management. The pros and cons of robotic, laparoscopic and open techniques were discussed. The pros and cons of mesh insertion were discussed. All questions answered. She understands and wishes to proceed with surgical intervention. 4. Restrictions discussed with [facility] and she expresses understanding. 5. She is advised to call back directly if there are further questions/concerns, or if her symptoms worsen prior to surgery.     SUBJECTIVE/OBJECTIVE:          Chief Complaint   Patient presents with    Surgical Consult       New patient-referred by Dr Montiel Ends hernia      HPI  [facility] is a 52-year-old female who presents for initial evaluation secondary to a incarcerated umbilical hernia. She has had this now for several months. Is gradually worsened over the last pregnancy. She has 2 children at this time. Mild to moderate discomfort. Worse with increased activity, lifting and bending over. Worsens when she is very active throughout the day. Improved with rest and lying down. No skin changes. Also has some bulging superior and inferior to the umbilical hernia. Found to have had diastases recti. Does not give much discomfort around this area. No significant previous abdominal surgery that she is aware of. Tolerating diet. No nausea or vomiting. Normal bowel function. No hematochezia or melena. No unexplained weight loss.   No fever, chills or sweats.     Review of Systems   Constitutional: Negative for activity change, appetite change, chills, diaphoresis, fatigue, fever and unexpected weight change. HENT: Negative for congestion, dental problem, drooling, ear discharge, ear pain, facial swelling, hearing loss, mouth sores, nosebleeds, postnasal drip, rhinorrhea, sinus pressure, sneezing, sore throat, tinnitus, trouble swallowing and voice change. Eyes: Negative for photophobia, pain, discharge, redness, itching and visual disturbance. Respiratory: Negative for apnea, cough, choking, chest tightness, shortness of breath, wheezing and stridor. Cardiovascular: Negative for chest pain, palpitations and leg swelling. Gastrointestinal: Positive for abdominal pain. Negative for abdominal distention, anal bleeding, blood in stool, constipation, diarrhea, nausea, rectal pain and vomiting. Endocrine: Negative. Genitourinary: Negative for decreased urine volume, difficulty urinating, dyspareunia, dysuria, enuresis, flank pain, frequency, genital sores, hematuria, menstrual problem, pelvic pain, urgency, vaginal bleeding, vaginal discharge and vaginal pain. Musculoskeletal: Negative for arthralgias, back pain, gait problem, joint swelling, myalgias, neck pain and neck stiffness. Skin: Negative for color change, pallor, rash and wound. Allergic/Immunologic: Negative. Neurological: Negative for dizziness, tremors, seizures, syncope, facial asymmetry, speech difficulty, weakness, light-headedness, numbness and headaches. Hematological: Negative for adenopathy. Does not bruise/bleed easily. Psychiatric/Behavioral: Negative for agitation, behavioral problems, confusion, decreased concentration, dysphoric mood, hallucinations, self-injury, sleep disturbance and suicidal ideas.  The patient is not nervous/anxious and is not hyperactive.          Past Medical History        Past Medical History:   Diagnosis Date    OCD (obsessive compulsive disorder)      PONV (postoperative nausea and vomiting)              Past Surgical History         Past Surgical History:   Procedure Laterality Date    ADENOIDECTOMY        [place] INDUCED ABORTN BY DIL/EVAC N/A 7/3/2018     DILATATION AND CURETTAGE SUCTION performed by Sofía Vega MD at [facility] Bilateral 2013            Current Facility-Administered Medications          Current Outpatient Medications   Medication Sig Dispense Refill    MONO-LINYAH 0.25-35 MG-MCG per tablet Take 1 tablet by mouth daily          No current facility-administered medications for this visit.                 Allergies   Allergen Reactions    Bactrim [Sulfamethoxazole-Trimethoprim] Shortness Of Breath and Itching         Family History         Family History   Problem Relation Age of Onset    Other Neg Hx              Social History               Socioeconomic History    Marital status:        Spouse name: Not on file    Number of children: Not on file    Years of education: Not on file    Highest education level: Not on file   Occupational History    Not on file   Tobacco Use    Smoking status: Former Smoker    Smokeless tobacco: Never Used   Vaping Use    Vaping Use: Never used   Substance and Sexual Activity    Alcohol use: Not Currently       Alcohol/week: 0.0 standard drinks       Comment: occ - none since current pregnancy     Drug use: No    Sexual activity: Yes       Partners: Male   Other Topics Concern    Not on file   Social History Narrative    Not on file      Social Determinants of Health          Financial Resource Strain:     Difficulty of Paying Living Expenses:    Food Insecurity:     Worried About Running Out of Food in the Last Year:     Ran Out of Food in the Last Year:    Transportation Needs:     Lack of Transportation (Medical):      Lack of Transportation (Non-Medical):    Physical Activity:     Days of Exercise per Week:     Minutes of Exercise per Session:    Stress:     Feeling of Stress :    Social Connections:     Frequency of Communication with Friends and Family:     Frequency of Social Gatherings with Friends and Family:     Attends Sabianist Services:     Active Member of Clubs or Organizations:     Attends Club or Organization Meetings:     Marital Status:    Intimate Partner Violence:     Fear of Current or Ex-Partner:     Emotionally Abused:     Physically Abused:     Sexually Abused:          Vitals       Vitals:     06/23/21 1318   BP: 106/74   Site: Left Upper Arm   Position: Sitting   Cuff Size: Medium Adult   Pulse: 89   Resp: 18   Temp: 97.3 °F (36.3 °C)   TempSrc: Temporal   SpO2: 98%   Weight: 177 lb (80.3 kg)   Height: 5' 10\" (1.778 m)         Body mass index is 25.4 kg/m².         Wt Readings from Last 3 Encounters:   06/23/21 177 lb (80.3 kg)   11/05/20 213 lb (96.6 kg)   07/08/20 191 lb 3.2 oz (86.7 kg)      Physical Exam  Vitals reviewed. Constitutional:       General: She is not in acute distress. Appearance: She is well-developed. She is not diaphoretic. HENT:      Head: Normocephalic and atraumatic. Right Ear: External ear normal.      Left Ear: External ear normal.      Nose: Nose normal.   Eyes:      General: No scleral icterus. Right eye: No discharge. Left eye: No discharge. Conjunctiva/sclera: Conjunctivae normal.   Cardiovascular:      Rate and Rhythm: Normal rate and regular rhythm. Heart sounds: Normal heart sounds. Pulmonary:      Effort: Pulmonary effort is normal. No respiratory distress. Breath sounds: Normal breath sounds. No wheezing or rales. Chest:      Chest wall: No tenderness. Abdominal:      General: Bowel sounds are normal. There is no distension. Palpations: Abdomen is soft. There is no mass. Tenderness: There is abdominal tenderness in the periumbilical area. There is no guarding or rebound. Hernia: A hernia is present. Hernia is present in the umbilical area.    Musculoskeletal:         General: No tenderness. Normal range of motion. Cervical back: Normal range of motion and neck supple. Skin:     General: Skin is warm and dry. Coloration: Skin is not pale. Findings: No erythema or rash. Neurological:      Mental Status: She is alert and oriented to person, place, and time. Cranial Nerves: No cranial nerve deficit. Psychiatric:         Behavior: Behavior normal.         Thought Content:  Thought content normal.         Judgment: Judgment normal.               Lab Results   Component Value Date      01/05/2013     K 4.3 01/05/2013      01/05/2013     CO2 26 01/05/2013            Lab Results   Component Value Date     CREATININE 0.7 01/05/2013            Lab Results   Component Value Date     WBC 10.0 11/05/2020     HGB 12.5 11/05/2020     HCT 37.9 11/05/2020     MCV 92.4 11/05/2020      11/05/2020      No results found for: ALT, AST, GGT, ALKPHOS, BILITOT     Imaging - none         Patient Active Problem List   Diagnosis    OCD (obsessive compulsive disorder)    Recurrent cystitis    Hypotonic bladder    S/P D&C (status post dilation and curettage)    Vaginal delivery    Spontaneous vaginal delivery      An electronic signature was used to authenticate this note.  Kimberlyn Alfaro MD

## 2021-07-21 ENCOUNTER — ANESTHESIA EVENT (OUTPATIENT)
Dept: OPERATING ROOM | Age: 31
End: 2021-07-21
Payer: COMMERCIAL

## 2021-07-22 ENCOUNTER — ANESTHESIA (OUTPATIENT)
Dept: OPERATING ROOM | Age: 31
End: 2021-07-22
Payer: COMMERCIAL

## 2021-07-22 ENCOUNTER — HOSPITAL ENCOUNTER (OUTPATIENT)
Age: 31
Setting detail: OUTPATIENT SURGERY
Discharge: HOME | End: 2021-07-22
Attending: SURGERY | Admitting: SURGERY
Payer: COMMERCIAL

## 2021-07-22 VITALS
DIASTOLIC BLOOD PRESSURE: 80 MMHG | HEART RATE: 106 BPM | HEIGHT: 70 IN | WEIGHT: 172 LBS | BODY MASS INDEX: 24.62 KG/M2 | TEMPERATURE: 97 F | RESPIRATION RATE: 18 BRPM | OXYGEN SATURATION: 100 % | SYSTOLIC BLOOD PRESSURE: 131 MMHG

## 2021-07-22 VITALS — TEMPERATURE: 97.5 F | SYSTOLIC BLOOD PRESSURE: 147 MMHG | DIASTOLIC BLOOD PRESSURE: 66 MMHG | OXYGEN SATURATION: 97 %

## 2021-07-22 DIAGNOSIS — K42.0 INCARCERATED UMBILICAL HERNIA: Primary | ICD-10-CM

## 2021-07-22 LAB — PREGNANCY, URINE: NEGATIVE

## 2021-07-22 PROCEDURE — 2500000003 HC RX 250 WO HCPCS: Performed by: SURGERY

## 2021-07-22 PROCEDURE — 6360000002 HC RX W HCPCS: Performed by: NURSE ANESTHETIST, CERTIFIED REGISTERED

## 2021-07-22 PROCEDURE — 3600000009 HC SURGERY ROBOT BASE: Performed by: SURGERY

## 2021-07-22 PROCEDURE — 7100000011 HC PHASE II RECOVERY - ADDTL 15 MIN: Performed by: SURGERY

## 2021-07-22 PROCEDURE — 3700000001 HC ADD 15 MINUTES (ANESTHESIA): Performed by: SURGERY

## 2021-07-22 PROCEDURE — 6370000000 HC RX 637 (ALT 250 FOR IP)

## 2021-07-22 PROCEDURE — 6370000000 HC RX 637 (ALT 250 FOR IP): Performed by: SURGERY

## 2021-07-22 PROCEDURE — 81025 URINE PREGNANCY TEST: CPT

## 2021-07-22 PROCEDURE — 3600000019 HC SURGERY ROBOT ADDTL 15MIN: Performed by: SURGERY

## 2021-07-22 PROCEDURE — 6360000002 HC RX W HCPCS

## 2021-07-22 PROCEDURE — C1781 MESH (IMPLANTABLE): HCPCS | Performed by: SURGERY

## 2021-07-22 PROCEDURE — 6360000002 HC RX W HCPCS: Performed by: ANESTHESIOLOGY

## 2021-07-22 PROCEDURE — 7100000000 HC PACU RECOVERY - FIRST 15 MIN: Performed by: SURGERY

## 2021-07-22 PROCEDURE — S2900 ROBOTIC SURGICAL SYSTEM: HCPCS | Performed by: SURGERY

## 2021-07-22 PROCEDURE — 2709999900 HC NON-CHARGEABLE SUPPLY: Performed by: SURGERY

## 2021-07-22 PROCEDURE — 2580000003 HC RX 258

## 2021-07-22 PROCEDURE — 7100000010 HC PHASE II RECOVERY - FIRST 15 MIN: Performed by: SURGERY

## 2021-07-22 PROCEDURE — 3700000000 HC ANESTHESIA ATTENDED CARE: Performed by: SURGERY

## 2021-07-22 PROCEDURE — 15734 MUSCLE-SKIN GRAFT TRUNK: CPT | Performed by: SURGERY

## 2021-07-22 PROCEDURE — 49653 PR LAP, VENTRAL HERNIA REPAIR,INCARCERATED: CPT | Performed by: SURGERY

## 2021-07-22 PROCEDURE — 7100000001 HC PACU RECOVERY - ADDTL 15 MIN: Performed by: SURGERY

## 2021-07-22 PROCEDURE — 2500000003 HC RX 250 WO HCPCS: Performed by: NURSE ANESTHETIST, CERTIFIED REGISTERED

## 2021-07-22 DEVICE — MESH HERN W4XL6IN ELLIPSE W/ ECHO PS POS SYS VENTRALIGHT ST: Type: IMPLANTABLE DEVICE | Site: ABDOMEN | Status: FUNCTIONAL

## 2021-07-22 RX ORDER — MORPHINE SULFATE 2 MG/ML
4 INJECTION, SOLUTION INTRAMUSCULAR; INTRAVENOUS
Status: DISCONTINUED | OUTPATIENT
Start: 2021-07-22 | End: 2021-07-22 | Stop reason: HOSPADM

## 2021-07-22 RX ORDER — ONDANSETRON 4 MG/1
4 TABLET, ORALLY DISINTEGRATING ORAL EVERY 8 HOURS PRN
Status: DISCONTINUED | OUTPATIENT
Start: 2021-07-22 | End: 2021-07-22 | Stop reason: HOSPADM

## 2021-07-22 RX ORDER — SODIUM CHLORIDE 9 MG/ML
25 INJECTION, SOLUTION INTRAVENOUS PRN
Status: DISCONTINUED | OUTPATIENT
Start: 2021-07-22 | End: 2021-07-22 | Stop reason: HOSPADM

## 2021-07-22 RX ORDER — MIDAZOLAM HYDROCHLORIDE 1 MG/ML
INJECTION INTRAMUSCULAR; INTRAVENOUS PRN
Status: DISCONTINUED | OUTPATIENT
Start: 2021-07-22 | End: 2021-07-22 | Stop reason: SDUPTHER

## 2021-07-22 RX ORDER — KETOROLAC TROMETHAMINE 30 MG/ML
30 INJECTION, SOLUTION INTRAMUSCULAR; INTRAVENOUS ONCE
Status: COMPLETED | OUTPATIENT
Start: 2021-07-22 | End: 2021-07-22

## 2021-07-22 RX ORDER — ROCURONIUM BROMIDE 10 MG/ML
INJECTION, SOLUTION INTRAVENOUS PRN
Status: DISCONTINUED | OUTPATIENT
Start: 2021-07-22 | End: 2021-07-22 | Stop reason: SDUPTHER

## 2021-07-22 RX ORDER — SODIUM CHLORIDE 9 MG/ML
INJECTION, SOLUTION INTRAVENOUS CONTINUOUS
Status: DISCONTINUED | OUTPATIENT
Start: 2021-07-22 | End: 2021-07-22 | Stop reason: HOSPADM

## 2021-07-22 RX ORDER — FENTANYL CITRATE 50 UG/ML
50 INJECTION, SOLUTION INTRAMUSCULAR; INTRAVENOUS EVERY 5 MIN PRN
Status: DISCONTINUED | OUTPATIENT
Start: 2021-07-22 | End: 2021-07-22 | Stop reason: HOSPADM

## 2021-07-22 RX ORDER — LIDOCAINE HCL/PF 100 MG/5ML
SYRINGE (ML) INJECTION PRN
Status: DISCONTINUED | OUTPATIENT
Start: 2021-07-22 | End: 2021-07-22 | Stop reason: SDUPTHER

## 2021-07-22 RX ORDER — MEPERIDINE HYDROCHLORIDE 25 MG/ML
INJECTION INTRAMUSCULAR; INTRAVENOUS; SUBCUTANEOUS
Status: COMPLETED
Start: 2021-07-22 | End: 2021-07-22

## 2021-07-22 RX ORDER — ONDANSETRON 2 MG/ML
INJECTION INTRAMUSCULAR; INTRAVENOUS PRN
Status: DISCONTINUED | OUTPATIENT
Start: 2021-07-22 | End: 2021-07-22 | Stop reason: SDUPTHER

## 2021-07-22 RX ORDER — PROMETHAZINE HYDROCHLORIDE 25 MG/ML
25 INJECTION, SOLUTION INTRAMUSCULAR; INTRAVENOUS ONCE
Status: COMPLETED | OUTPATIENT
Start: 2021-07-22 | End: 2021-07-22

## 2021-07-22 RX ORDER — SODIUM CHLORIDE 0.9 % (FLUSH) 0.9 %
5-40 SYRINGE (ML) INJECTION EVERY 12 HOURS SCHEDULED
Status: DISCONTINUED | OUTPATIENT
Start: 2021-07-22 | End: 2021-07-22 | Stop reason: HOSPADM

## 2021-07-22 RX ORDER — HYDROCODONE BITARTRATE AND ACETAMINOPHEN 5; 325 MG/1; MG/1
1-2 TABLET ORAL EVERY 6 HOURS PRN
Qty: 30 TABLET | Refills: 0 | Status: SHIPPED | OUTPATIENT
Start: 2021-07-22 | End: 2021-07-25

## 2021-07-22 RX ORDER — OXYCODONE HYDROCHLORIDE 5 MG/1
5 TABLET ORAL EVERY 4 HOURS PRN
Status: DISCONTINUED | OUTPATIENT
Start: 2021-07-22 | End: 2021-07-22 | Stop reason: HOSPADM

## 2021-07-22 RX ORDER — KETOROLAC TROMETHAMINE 10 MG/1
10 TABLET, FILM COATED ORAL EVERY 8 HOURS PRN
Qty: 15 TABLET | Refills: 0 | Status: SHIPPED | OUTPATIENT
Start: 2021-07-22 | End: 2021-08-04

## 2021-07-22 RX ORDER — FENTANYL CITRATE 50 UG/ML
INJECTION, SOLUTION INTRAMUSCULAR; INTRAVENOUS PRN
Status: DISCONTINUED | OUTPATIENT
Start: 2021-07-22 | End: 2021-07-22 | Stop reason: SDUPTHER

## 2021-07-22 RX ORDER — SODIUM CHLORIDE 0.9 % (FLUSH) 0.9 %
5-40 SYRINGE (ML) INJECTION PRN
Status: DISCONTINUED | OUTPATIENT
Start: 2021-07-22 | End: 2021-07-22 | Stop reason: HOSPADM

## 2021-07-22 RX ORDER — SCOLOPAMINE TRANSDERMAL SYSTEM 1 MG/1
1 PATCH, EXTENDED RELEASE TRANSDERMAL
Status: DISCONTINUED | OUTPATIENT
Start: 2021-07-22 | End: 2021-07-22 | Stop reason: HOSPADM

## 2021-07-22 RX ORDER — SCOLOPAMINE TRANSDERMAL SYSTEM 1 MG/1
PATCH, EXTENDED RELEASE TRANSDERMAL
Status: COMPLETED
Start: 2021-07-22 | End: 2021-07-22

## 2021-07-22 RX ORDER — PROMETHAZINE HYDROCHLORIDE 25 MG/ML
INJECTION, SOLUTION INTRAMUSCULAR; INTRAVENOUS
Status: COMPLETED
Start: 2021-07-22 | End: 2021-07-22

## 2021-07-22 RX ORDER — PROPOFOL 10 MG/ML
INJECTION, EMULSION INTRAVENOUS PRN
Status: DISCONTINUED | OUTPATIENT
Start: 2021-07-22 | End: 2021-07-22 | Stop reason: SDUPTHER

## 2021-07-22 RX ORDER — MORPHINE SULFATE 2 MG/ML
2 INJECTION, SOLUTION INTRAMUSCULAR; INTRAVENOUS EVERY 5 MIN PRN
Status: DISCONTINUED | OUTPATIENT
Start: 2021-07-22 | End: 2021-07-22 | Stop reason: HOSPADM

## 2021-07-22 RX ORDER — ONDANSETRON 2 MG/ML
4 INJECTION INTRAMUSCULAR; INTRAVENOUS EVERY 6 HOURS PRN
Status: DISCONTINUED | OUTPATIENT
Start: 2021-07-22 | End: 2021-07-22 | Stop reason: HOSPADM

## 2021-07-22 RX ORDER — BUPIVACAINE HYDROCHLORIDE AND EPINEPHRINE 5; 5 MG/ML; UG/ML
INJECTION, SOLUTION EPIDURAL; INTRACAUDAL; PERINEURAL PRN
Status: DISCONTINUED | OUTPATIENT
Start: 2021-07-22 | End: 2021-07-22 | Stop reason: ALTCHOICE

## 2021-07-22 RX ORDER — DEXAMETHASONE SODIUM PHOSPHATE 10 MG/ML
INJECTION, EMULSION INTRAMUSCULAR; INTRAVENOUS PRN
Status: DISCONTINUED | OUTPATIENT
Start: 2021-07-22 | End: 2021-07-22 | Stop reason: SDUPTHER

## 2021-07-22 RX ORDER — MORPHINE SULFATE 2 MG/ML
2 INJECTION, SOLUTION INTRAMUSCULAR; INTRAVENOUS
Status: DISCONTINUED | OUTPATIENT
Start: 2021-07-22 | End: 2021-07-22 | Stop reason: HOSPADM

## 2021-07-22 RX ORDER — OXYCODONE HYDROCHLORIDE 5 MG/1
10 TABLET ORAL EVERY 4 HOURS PRN
Status: DISCONTINUED | OUTPATIENT
Start: 2021-07-22 | End: 2021-07-22 | Stop reason: HOSPADM

## 2021-07-22 RX ORDER — MEPERIDINE HYDROCHLORIDE 25 MG/ML
25 INJECTION INTRAMUSCULAR; INTRAVENOUS; SUBCUTANEOUS ONCE
Status: COMPLETED | OUTPATIENT
Start: 2021-07-22 | End: 2021-07-22

## 2021-07-22 RX ORDER — KETOROLAC TROMETHAMINE 30 MG/ML
INJECTION, SOLUTION INTRAMUSCULAR; INTRAVENOUS
Status: COMPLETED
Start: 2021-07-22 | End: 2021-07-22

## 2021-07-22 RX ORDER — FENTANYL CITRATE 50 UG/ML
50 INJECTION, SOLUTION INTRAMUSCULAR; INTRAVENOUS EVERY 4 HOURS PRN
Status: DISCONTINUED | OUTPATIENT
Start: 2021-07-22 | End: 2021-07-22 | Stop reason: HOSPADM

## 2021-07-22 RX ORDER — LABETALOL 20 MG/4 ML (5 MG/ML) INTRAVENOUS SYRINGE
10 EVERY 10 MIN PRN
Status: DISCONTINUED | OUTPATIENT
Start: 2021-07-22 | End: 2021-07-22 | Stop reason: HOSPADM

## 2021-07-22 RX ORDER — NEOSTIGMINE METHYLSULFATE 5 MG/5 ML
SYRINGE (ML) INTRAVENOUS PRN
Status: DISCONTINUED | OUTPATIENT
Start: 2021-07-22 | End: 2021-07-22 | Stop reason: SDUPTHER

## 2021-07-22 RX ORDER — GLYCOPYRROLATE 1 MG/5 ML
SYRINGE (ML) INTRAVENOUS PRN
Status: DISCONTINUED | OUTPATIENT
Start: 2021-07-22 | End: 2021-07-22 | Stop reason: SDUPTHER

## 2021-07-22 RX ADMIN — FENTANYL CITRATE 50 MCG: 50 INJECTION, SOLUTION INTRAMUSCULAR; INTRAVENOUS at 12:03

## 2021-07-22 RX ADMIN — KETOROLAC TROMETHAMINE 30 MG: 30 INJECTION, SOLUTION INTRAMUSCULAR; INTRAVENOUS at 12:10

## 2021-07-22 RX ADMIN — HYDROMORPHONE HYDROCHLORIDE 0.5 MG: 1 INJECTION, SOLUTION INTRAMUSCULAR; INTRAVENOUS; SUBCUTANEOUS at 12:15

## 2021-07-22 RX ADMIN — FENTANYL CITRATE 100 MCG: 50 INJECTION, SOLUTION INTRAMUSCULAR; INTRAVENOUS at 10:55

## 2021-07-22 RX ADMIN — HYDROMORPHONE HYDROCHLORIDE 0.5 MG: 1 INJECTION, SOLUTION INTRAMUSCULAR; INTRAVENOUS; SUBCUTANEOUS at 12:20

## 2021-07-22 RX ADMIN — DEXAMETHASONE SODIUM PHOSPHATE 10 MG: 10 INJECTION, EMULSION INTRAMUSCULAR; INTRAVENOUS at 11:10

## 2021-07-22 RX ADMIN — PROMETHAZINE HYDROCHLORIDE 25 MG: 25 INJECTION INTRAMUSCULAR; INTRAVENOUS at 12:35

## 2021-07-22 RX ADMIN — Medication 60 MG: at 10:58

## 2021-07-22 RX ADMIN — FENTANYL CITRATE 100 MCG: 50 INJECTION, SOLUTION INTRAMUSCULAR; INTRAVENOUS at 11:13

## 2021-07-22 RX ADMIN — PROMETHAZINE HYDROCHLORIDE 25 MG: 25 INJECTION, SOLUTION INTRAMUSCULAR; INTRAVENOUS at 12:35

## 2021-07-22 RX ADMIN — MIDAZOLAM 2 MG: 1 INJECTION INTRAMUSCULAR; INTRAVENOUS at 10:54

## 2021-07-22 RX ADMIN — CEFAZOLIN 2000 MG: 10 INJECTION, POWDER, FOR SOLUTION INTRAVENOUS at 10:53

## 2021-07-22 RX ADMIN — Medication 3 MG: at 11:56

## 2021-07-22 RX ADMIN — FENTANYL CITRATE 50 MCG: 50 INJECTION, SOLUTION INTRAMUSCULAR; INTRAVENOUS at 12:06

## 2021-07-22 RX ADMIN — PROPOFOL 200 MG: 10 INJECTION, EMULSION INTRAVENOUS at 10:58

## 2021-07-22 RX ADMIN — MEPERIDINE HYDROCHLORIDE 25 MG: 25 INJECTION INTRAMUSCULAR; INTRAVENOUS; SUBCUTANEOUS at 12:50

## 2021-07-22 RX ADMIN — Medication 0.6 MG: at 11:56

## 2021-07-22 RX ADMIN — OXYCODONE 10 MG: 5 TABLET ORAL at 13:40

## 2021-07-22 RX ADMIN — ONDANSETRON HYDROCHLORIDE 4 MG: 4 INJECTION, SOLUTION INTRAMUSCULAR; INTRAVENOUS at 11:59

## 2021-07-22 RX ADMIN — SODIUM CHLORIDE: 9 INJECTION, SOLUTION INTRAVENOUS at 09:03

## 2021-07-22 RX ADMIN — ROCURONIUM BROMIDE 50 MG: 10 INJECTION INTRAVENOUS at 10:58

## 2021-07-22 RX ADMIN — SODIUM CHLORIDE: 9 INJECTION, SOLUTION INTRAVENOUS at 12:43

## 2021-07-22 ASSESSMENT — PULMONARY FUNCTION TESTS
PIF_VALUE: 2
PIF_VALUE: 18
PIF_VALUE: 13
PIF_VALUE: 16
PIF_VALUE: 15
PIF_VALUE: 13
PIF_VALUE: 13
PIF_VALUE: 2
PIF_VALUE: 18
PIF_VALUE: 19
PIF_VALUE: 14
PIF_VALUE: 16
PIF_VALUE: 18
PIF_VALUE: 13
PIF_VALUE: 16
PIF_VALUE: 18
PIF_VALUE: 17
PIF_VALUE: 1
PIF_VALUE: 15
PIF_VALUE: 13
PIF_VALUE: 1
PIF_VALUE: 3
PIF_VALUE: 18
PIF_VALUE: 18
PIF_VALUE: 2
PIF_VALUE: 13
PIF_VALUE: 16
PIF_VALUE: 17
PIF_VALUE: 0
PIF_VALUE: 16
PIF_VALUE: 0
PIF_VALUE: 18
PIF_VALUE: 17
PIF_VALUE: 17
PIF_VALUE: 13
PIF_VALUE: 16
PIF_VALUE: 12
PIF_VALUE: 13
PIF_VALUE: 18
PIF_VALUE: 18
PIF_VALUE: 0
PIF_VALUE: 0
PIF_VALUE: 4
PIF_VALUE: 18
PIF_VALUE: 18
PIF_VALUE: 13
PIF_VALUE: 6
PIF_VALUE: 18
PIF_VALUE: 14
PIF_VALUE: 6
PIF_VALUE: 17
PIF_VALUE: 14
PIF_VALUE: 18
PIF_VALUE: 1
PIF_VALUE: 18
PIF_VALUE: 18
PIF_VALUE: 21
PIF_VALUE: 18
PIF_VALUE: 16
PIF_VALUE: 18
PIF_VALUE: 16
PIF_VALUE: 18
PIF_VALUE: 17
PIF_VALUE: 1
PIF_VALUE: 13
PIF_VALUE: 17
PIF_VALUE: 13
PIF_VALUE: 2
PIF_VALUE: 16
PIF_VALUE: 16
PIF_VALUE: 17
PIF_VALUE: 15
PIF_VALUE: 21
PIF_VALUE: 18

## 2021-07-22 ASSESSMENT — PAIN SCALES - GENERAL
PAINLEVEL_OUTOF10: 4
PAINLEVEL_OUTOF10: 5
PAINLEVEL_OUTOF10: 8
PAINLEVEL_OUTOF10: 7
PAINLEVEL_OUTOF10: 8
PAINLEVEL_OUTOF10: 7
PAINLEVEL_OUTOF10: 5
PAINLEVEL_OUTOF10: 8
PAINLEVEL_OUTOF10: 8

## 2021-07-22 ASSESSMENT — PAIN DESCRIPTION - FREQUENCY: FREQUENCY: CONTINUOUS

## 2021-07-22 ASSESSMENT — PAIN DESCRIPTION - LOCATION: LOCATION: ABDOMEN

## 2021-07-22 ASSESSMENT — PAIN DESCRIPTION - PAIN TYPE: TYPE: ACUTE PAIN;SURGICAL PAIN

## 2021-07-22 ASSESSMENT — PAIN - FUNCTIONAL ASSESSMENT: PAIN_FUNCTIONAL_ASSESSMENT: 0-10

## 2021-07-22 ASSESSMENT — PAIN DESCRIPTION - DESCRIPTORS: DESCRIPTORS: SHARP;STABBING

## 2021-07-22 NOTE — BRIEF OP NOTE
Brief Postoperative Note      Patient: Layton Gibson  YOB: 1990  MRN: [id number]    Date of Procedure: 7/22/2021    Pre-Op Diagnosis: INCARCERATED UMBILICAL HERNIA; DIASTASIS RECTI    Post-Op Diagnosis: Same       Procedure(s):  ROBOTIC UMBILICAL HERNIA REPAIR WITH MESH    Surgeon(s):  Rudi Perez MD    Assistant:  First Assistant: Candelario Bob RN    Anesthesia: General/Local    Estimated Blood Loss (mL): 10 ml    Complications: None    Specimens:   * No specimens in log *    Implants:  Implant Name Type Inv.  Item Serial No.  Lot No. LRB No. Used Action   MESH JENIFER [id number] ELLIPSE W/ ECHO PS POS SYS VENTRALIGHT ST  MESH JENIFER [id number] ELLIPSE W/ ECHO PS POS SYS VENTRALIGHT ST  BARD DAVOL-WD [id number] N/A 1 Implanted         Drains: * No LDAs found *    Findings: as above - see op note for details    Electronically signed by Rudi Perez MD on 7/22/2021 at 12:05 PM

## 2021-07-22 NOTE — PROGRESS NOTES
Pt admitted to [facility] room 17 and oriented to unit. SCD sleeves applied. Nares swabbed. Pt verbalized permission for first name, last initial and physicians name on white board. SDS board and discharge criteria explained, pt and family verbalized understanding. Pt denies thoughts of harming self or others. Call light in reach. Family at the bedside.

## 2021-07-22 NOTE — ANESTHESIA PRE PROCEDURE
Department of Anesthesiology  Preprocedure Note       Name:  Washington Trevino   Age:  35 y.o.  :  1990                                          MRN:  [id number]         Date:  2021      Surgeon: Shweta Hopson):  Aleida Bah MD    Procedure: Procedure(s):  ROBOTIC UMBILICAL HERNIA REPAIR POSS MESH POSS OPEN    Medications prior to admission:   Prior to Admission medications    Medication Sig Start Date End Date Taking? Authorizing Provider   [facility] 0.25-35 MG-MCG per tablet Take 1 tablet by mouth daily 21  Yes Historical Provider, MD       Current medications:    Current Facility-Administered Medications   Medication Dose Route Frequency Provider Last Rate Last Admin    0.9 % sodium chloride infusion   Intravenous Continuous Patti Dey  mL/hr at  0903 New Bag at 21 0903    ceFAZolin (ANCEF) 2000 mg in dextrose 5 % 50 mL IVPB  2,000 mg Intravenous 30 Min Pre-Op Patti Dey LPN           Allergies:     Allergies   Allergen Reactions    Bactrim [Sulfamethoxazole-Trimethoprim] Shortness Of Breath and Itching       Problem List:    Patient Active Problem List   Diagnosis Code    OCD (obsessive compulsive disorder) F42.9    Recurrent cystitis N30.90    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    S/P D&C (status post dilation and curettage) Z98.890    Vaginal delivery O80    Spontaneous vaginal delivery O80       Past Medical History:        Diagnosis Date    OCD (obsessive compulsive disorder)     PONV (postoperative nausea and vomiting)        Past Surgical History:        Procedure Laterality Date    ADENOIDECTOMY      [place] INDUCED ABORTN BY DIL/EVAC N/A 7/3/2018    DILATATION AND CURETTAGE SUCTION performed by Greta Garrido MD at [address] Bilateral 2013       Social History:    Social History     Tobacco Use    Smoking status: Never Smoker    Smokeless tobacco: Never Used   Substance Use Topics    Alcohol use: Yes     Alcohol/week: 0.0 standard drinks     Comment: occasionally                                Counseling given: Not Answered      Vital Signs (Current):   Vitals:    07/22/21 0843   BP: 122/82   Pulse: 90   Resp: 20   Temp: 97.5 °F (36.4 °C)   TempSrc: Tympanic   SpO2: 100%   Weight: 172 lb (78 kg)   Height: 5' 10\" (1.778 m)                                              BP Readings from Last 3 Encounters:   07/22/21 122/82   06/23/21 106/74   11/06/20 119/81       NPO Status: Time of last liquid consumption: 2250                        Time of last solid consumption: 1900                        Date of last liquid consumption: 07/21/21                        Date of last solid food consumption: 07/21/21    BMI:   Wt Readings from Last 3 Encounters:   07/22/21 172 lb (78 kg)   06/23/21 177 lb (80.3 kg)   11/05/20 213 lb (96.6 kg)     Body mass index is 24.68 kg/m². CBC:   Lab Results   Component Value Date    WBC 10.0 11/05/2020    RBC 4.10 11/05/2020    HGB 12.5 11/05/2020    HCT 37.9 11/05/2020    MCV 92.4 11/05/2020    RDW 12.5 01/05/2013     11/05/2020       CMP:   Lab Results   Component Value Date     01/05/2013    K 4.3 01/05/2013     01/05/2013    CO2 26 01/05/2013    BUN 10 01/05/2013    CREATININE 0.7 01/05/2013    LABGLOM >90 01/06/2013    GLUCOSE 98 01/05/2013    CALCIUM 9.4 01/05/2013       POC Tests: No results for input(s): POCGLU, POCNA, POCK, POCCL, POCBUN, POCHEMO, POCHCT in the last 72 hours. Coags: No results found for: PROTIME, INR, APTT    HCG (If Applicable):   Lab Results   Component Value Date    PREGTESTUR NEGATIVE 07/22/2021    PREGSERUM NEGATIVE 01/05/2013        ABGs: No results found for: PHART, PO2ART, NJB9QMW, VAU8UVB, BEART, Z0QDYBGV     Type & Screen (If Applicable):  Lab Results   Component Value Date    LABRH POS 11/05/2020       Drug/Infectious Status (If Applicable):  No results found for: HIV, HEPCAB    COVID-19 Screening (If Applicable):  No results found for: COVID19        Anesthesia Evaluation     history of anesthetic complications: PONV. Airway: Mallampati: II  TM distance: >3 FB   Neck ROM: full  Mouth opening: > = 3 FB Dental:          Pulmonary: breath sounds clear to auscultation                             Cardiovascular:            Rhythm: regular                      Neuro/Psych:   (+) psychiatric history:            GI/Hepatic/Renal:             Endo/Other:                     Abdominal:             Vascular: Other Findings:             Anesthesia Plan      general     ASA 2       Induction: intravenous. MIPS: Postoperative opioids intended and Prophylactic antiemetics administered. Anesthetic plan and risks discussed with patient and spouse. Plan discussed with CRNA.                   Stella Díaz MD   7/22/2021

## 2021-07-22 NOTE — PROGRESS NOTES
Pt returned to [facility] room 17. Vitals and assessment as charted. 0.9 infusing, @750ml to count from PACU. Pt has crackers and water. Family at the bedside. Pt and family verbalized understanding of discharge criteria and call light use. Call light in reach.

## 2021-07-22 NOTE — OP NOTE
[address] [place] [address]                                OPERATIVE REPORT    PATIENT NAME: Davy Rowe                     :        1990  MED REC NO:   [id number]                           ROOM:  ACCOUNT NO:   [de-identified]                           ADMIT DATE: 2021  PROVIDER:     YAZMIN Sherman OF PROCEDURE:  2021    PREOPERATIVE DIAGNOSES:  1. Incarcerated umbilical hernia. 2.  Diastasis recti. POSTOPERATIVE DIAGNOSES:  1. Incarcerated umbilical hernia. 2.  Diastasis recti. PROCEDURE:  Robotic repair of incarcerated umbilical hernia with mesh  (10 x 15 cm, Ventralight mesh). SURGEON:  Armin Page MD    ASSISTANT:  Lesley Euceda. Shearer, [facility]    ANESTHESIA:  General/local.    ESTIMATED BLOOD LOSS:  5 mL. DRAINS:  None. COMPLICATIONS:  None. DISPOSITION:  Stable to the recovery room. INDICATION:  The patient is a 78-year-old female  who I had seen in the  office secondary to an incarcerated umbilical hernia. She also has some  diastasis recti given pregnancies. Discomfort was worsening. Both  operative and nonoperative intervention plans were discussed. Risks of  surgery were further discussed. Some of the risks included but were not  limited to bleeding, infection, the need for reoperation, severe chronic  postoperative pain or numbness, major vascular or nerve injury,  cardiopulmonary complications, anesthetic complications, seroma/hematoma  formation, wound breakdown, chronic abdominal pain, recurrence of the  hernia, mesh infection requiring removal of the mesh, and death. After  all of the questions were answered in their entirety and the patient was  completely aware of the current situation, she wished to proceed with  surgery.     DESCRIPTION OF THE PROCEDURE:  After informed consent was signed and  placed on the chart, the patient was then taken back to the operating  room, placed supine on the operating room table. General anesthesia was  induced. She tolerated this well throughout the case. All pressure  points were padded. She was on preoperative antibiotics. Bilateral  lower extremity sequential compression devices were placed prior to  incision. Her abdomen and pelvis were prepped and draped in usual  sterile standard fashion. A timeout occurred prior to the operation,  which not only identified the patient, but also the planned procedure to  be performed. At the end of the timeout, there were no questions or  concerns. I began the operation by making a small skin nick at Upton's  point. Veress needle was inserted. Intraabdominal cavity was  insufflated to a pressure of approximately 15 mmHg with carbon dioxide  gas. The patient tolerated insufflation well. 8-mm trocar was then  placed on the left side of the abdomen. Laparoscope was inserted. Upon  initial evaluation, there was no hollow viscus, solid organ, or major  vascular injury with the Veress needle insertion or the first trocar  placement. Another 8-mm was then placed further down on the left  lateral abdominal region and then a 12 mm was then placed through a left  upper lateral incision where the Upton's point skin nick was extended  laterally. Robot was brought in and docked. Instruments were placed  under direct vision. Once everything was aligned and in order, I then  unscrubbed and went back to the console. I began the operation first by  reducing the incarcerated hernia, which had some omentum in it. Hernia  sac was excised. She did have a fair amount of diastasis just below and  above the umbilicus. The rest of the abdomen otherwise looked good. Using a #1 Stratafix and reducing the pressure to 10 mmHg, I then  reapproximated the fascial defect there at the umbilicus, which was  about 3 cm in diameter.   I did actually start a little bit above to help  reapproximate some of the diastasis of the rectus muscle superiorly. This was then ran distally closing the defects in its entirety and then  continued further down to reapproximate some of the loose fascia there  at the diastasis. This was done x2 with #1 Stratafix and tied. This  reapproximated the fascial defect in the midline without much difficulty  at all. Pressure was then increased back up to 15 mmHg. A 10 x 15 cm  piece of Ventralight was then chosen. This was then brought up with the  echo insufflation system. This was then sutured in as an underlay. A  double arm 2-0 Stratafix started inferiorly and then this was ran with  each arm heading laterally the opposite direction heading superiorly and  then tied. This completely reinforced the original defect with  appropriate overlap of mesh in the good health fascia. Instruments were  then removed. Robot undocked. I scrubbed back into the table. After  the needle was in the echo insufflation system had been removed, the  large trocar site was closed at the fascial level with Vicryl suture. At the completion of this, there were no fascial defects. Subcutaneous  tissues were irrigated. Hemostasis was adequate. Skin reapproximated  at all the incisional sites with 4-0 Vicryl in a subcuticular fashion. Closed incisions were then cleaned, dried, and Steri-Strips applied. Dry sterile dressings applied. Sponge, needle, and instrumentation  count was correct at the end of the procedure. The patient tolerated  the procedure well with no apparent complications and less than 10 mL  blood loss. She was easily brought out of general anesthesia and  transferred to postanesthesia care unit in stable condition.         Danitza Prather M.D.    D: 07/22/2021 12:04:48       T: 07/22/2021 13:21:09     [email or web address]  Job#: [id number]     Doc#: [id number]    CC:

## 2021-07-22 NOTE — ANESTHESIA POSTPROCEDURE EVALUATION
Department of Anesthesiology  Postprocedure Note    Patient: Miriam Frank  MRN: [id number]  YOB: 1990  Date of evaluation: 7/22/2021  Time:  2:05 PM     Procedure Summary     Date: 07/22/21 Room / Location: [address]    Anesthesia Start: 4624 Anesthesia Stop: 1214    Procedure: ROBOTIC UMBILICAL HERNIA REPAIR WITH MESH (N/A Abdomen) Diagnosis: (UMBILICAL HERNIA)    Surgeons: Janina Bullard MD Responsible Provider: Vipin Peres MD    Anesthesia Type: general ASA Status: 2          Anesthesia Type: general    Mk Phase I: Mk Score: 8    Mk Phase II: Mk Score: 10    Last vitals: Reviewed and per EMR flowsheets.        Anesthesia Post Evaluation    Patient location during evaluation: PACU  Patient participation: complete - patient participated  Level of consciousness: awake  Airway patency: patent  Nausea & Vomiting: no vomiting and no nausea  Complications: no  Cardiovascular status: hemodynamically stable  Respiratory status: acceptable  Hydration status: stable

## 2021-07-22 NOTE — INTERVAL H&P NOTE
Update History & Physical    The patient's History and Physical was reviewed with the patient and I examined the patient. There was no change. The surgical site was confirmed by the patient and me. Plan: The risks, benefits, expected outcome, and alternative to the recommended procedure have been discussed with the patient. Patient understands and wants to proceed with the procedure. The patient was counseled at length about the risks of catherine Covid-19 during their perioperative period and any recovery window from their procedure. The patient was made aware that catherine Covid-19  may worsen their prognosis for recovering from their procedure  and lend to a higher morbidity and/or mortality risk. All material risks, benefits, and reasonable alternatives including postponing the procedure were discussed. The patient does wish to proceed with the procedure at this time.     Electronically signed by Janina Bullard MD on 7/22/2021 at 10:21 AM

## 2021-07-22 NOTE — PROGRESS NOTES
1209 Arouses to name on arrival to PACU , pt moaning and c/o pain   1210 awake and oriented , c/o # 8 ABD pain , medicated with Toradol 30 mg IV   1215 no change in pain , medicated with Dilaudid 0.5 mg IV   1220 pain unchanged , medicated with dilaudid 0.5 mg IV  1235 pt c/o slight nausea , medicated with Phenergan 25 mg IM O2 off   1245 eyes closed resp easy   1248 pt started shivering , debra hugger applied   1250 medicated with demerol 25 mg IV  1205 pt denies any nausea , states pain tolerable and no =shivering noted   1208 meets criteria for discharge , transported to [facility]

## 2021-07-22 NOTE — PROGRESS NOTES
Pt has met discharge criteria and states she is ready for discharge to home. IV removed, gauze and tape applied. Dressed in own clothes and personal belongings gathered. Discharge instructions (with opioid medication education information) given to pt and family; pt and family verbalized understanding of discharge instructions, prescriptions x2 and follow up appointments. Pt transported to discharge lobby by [facility] staff.

## 2021-07-23 ENCOUNTER — TELEPHONE (OUTPATIENT)
Dept: SURGERY | Age: 31
End: 2021-07-23

## 2021-07-23 NOTE — TELEPHONE ENCOUNTER
Pt states that she is feeling well so far this morning. Pt states that she is urinating with no issues. Incisions are clean, dry and intact; she denies any n/v, fevers or chills. Pt advised to call the office if any questions or concerns.

## 2021-08-04 ENCOUNTER — OFFICE VISIT (OUTPATIENT)
Dept: SURGERY | Age: 31
End: 2021-08-04

## 2021-08-04 VITALS
RESPIRATION RATE: 18 BRPM | WEIGHT: 171 LBS | TEMPERATURE: 97.5 F | DIASTOLIC BLOOD PRESSURE: 72 MMHG | OXYGEN SATURATION: 98 % | SYSTOLIC BLOOD PRESSURE: 100 MMHG | BODY MASS INDEX: 24.54 KG/M2 | HEART RATE: 116 BPM

## 2021-08-04 DIAGNOSIS — Z09 S/P UMBILICAL HERNIA REPAIR, FOLLOW-UP EXAM: Primary | ICD-10-CM

## 2021-08-04 PROCEDURE — 99024 POSTOP FOLLOW-UP VISIT: CPT | Performed by: NURSE PRACTITIONER

## 2021-08-04 NOTE — PROGRESS NOTES
[address] [place] [facility] [address]  Dept: [phone]  Dept Fax: [phone]  Loc: [phone]    Visit Date: 8/4/2021    Rufina Lennon is a 35 y.o. female who presents today for:  Chief Complaint   Patient presents with    Post-Op Check     s/p  Robotic repair of incarcerated umbilical hernia with mesh (10 x 15 cm, Ventralight mesh)-7/22/2021       HPI:     HPI    Jorge Yusuf is a 36-year old female patient who presents today for follow up status post robotic repair of incarcerated umbilical hernia with mesh 2 weeks ago with Dr. Speedy Randolph. Doing as expected. Still some intermittent sharp pains with certain movements or activities. She has two little ones at home also where she is a full-time mom. Has help with  and family. Off all narcotics. Has not been taking anything OTC such as tylenol or motrin. Wears her abdominal binder from time to time. Mostly left sided discomfort with movements but it can be generalized at times. Abdominal wall incisions are clean, dry and intact without signs of infection. Appetite doing okay. Portion sizes are smaller. No nausea or vomiting. Bowel function returned. Up moving pretty well but gets fatigued quickly. Denies any SOB or chest pain. No lightheadedness or dizziness. No fevers or chills. No calf pain, swelling or tenderness. Urinating without issues.      Past Medical History:   Diagnosis Date    OCD (obsessive compulsive disorder)     PONV (postoperative nausea and vomiting)       Past Surgical History:   Procedure Laterality Date    ADENOIDECTOMY      HERNIA REPAIR N/A 4/01/2828    ROBOTIC UMBILICAL HERNIA REPAIR WITH MESH performed by Bhavesh Griggs MD at [address] [place] [address] BY DIL/EVAC N/A 7/3/2018    DILATATION AND CURETTAGE SUCTION performed by Lashon Vera MD at [address] EXTRACTION Bilateral 2013 Family History   Problem Relation Age of Onset    Other Neg Hx        Social History     Tobacco Use    Smoking status: Never Smoker    Smokeless tobacco: Never Used   Substance Use Topics    Alcohol use: Yes     Alcohol/week: 0.0 standard drinks     Comment: occasionally      Current Outpatient Medications   Medication Sig Dispense Refill    MONO-LINYAH 0.25-35 MG-MCG per tablet Take 1 tablet by mouth daily       No current facility-administered medications for this visit. Allergies   Allergen Reactions    Bactrim [Sulfamethoxazole-Trimethoprim] Shortness Of Breath and Itching       Subjective:     Review of Systems   Constitutional: Positive for activity change, appetite change and fatigue. Negative for chills, diaphoresis, fever and unexpected weight change. HENT: Negative for congestion, dental problem, hearing loss, rhinorrhea, sinus pressure and sore throat. Eyes: Negative for photophobia, pain, discharge, itching and visual disturbance. Respiratory: Negative for apnea, cough, choking, chest tightness, shortness of breath and wheezing. Cardiovascular: Negative for chest pain, palpitations and leg swelling. Gastrointestinal: Positive for abdominal pain. Negative for abdominal distention, anal bleeding, blood in stool, constipation, diarrhea, nausea and vomiting. Endocrine: Negative. Genitourinary: Negative for decreased urine volume, difficulty urinating, dysuria, frequency and urgency. Musculoskeletal: Negative for arthralgias, back pain, gait problem, joint swelling, myalgias and neck pain. Skin: Negative for color change, pallor, rash and wound. Allergic/Immunologic: Negative. Neurological: Negative for dizziness, tremors, weakness, numbness and headaches. Hematological: Negative. Psychiatric/Behavioral: Negative.         Objective:   /72 (Site: Left Upper Arm, Position: Sitting, Cuff Size: Medium Adult)   Pulse 116   Temp 97.5 °F (36.4 °C) (Infrared) Resp 18   Wt 171 lb (77.6 kg)   LMP 07/28/2021 (Approximate)   SpO2 98%   BMI 24.54 kg/m²     Physical Exam  Vitals reviewed. Constitutional:       General: She is not in acute distress. Appearance: Normal appearance. She is well-developed. She is not ill-appearing or toxic-appearing. HENT:      Head: Normocephalic and atraumatic. Right Ear: Hearing and external ear normal.      Left Ear: Hearing and external ear normal.      Nose: Nose normal.      Mouth/Throat:      Mouth: Mucous membranes are not pale, not dry and not cyanotic. Eyes:      General: Lids are normal.   Neck:      Trachea: Trachea and phonation normal.   Cardiovascular:      Rate and Rhythm: Normal rate and regular rhythm. Pulses: Normal pulses. Heart sounds: S1 normal and S2 normal.   Pulmonary:      Effort: Pulmonary effort is normal. No tachypnea, bradypnea, accessory muscle usage or respiratory distress. Breath sounds: Normal breath sounds. No decreased breath sounds, wheezing or rales. Chest:      Chest wall: No tenderness. Abdominal:      General: Bowel sounds are normal. There is no distension. Palpations: Abdomen is soft. There is no mass. Tenderness: There is abdominal tenderness. Musculoskeletal:         General: No tenderness. Normal range of motion. Cervical back: Normal range of motion and neck supple. Skin:     General: Skin is warm and dry. Findings: No abrasion, bruising, burn, ecchymosis, erythema, laceration, lesion or rash. Neurological:      Mental Status: She is alert and oriented to person, place, and time. Motor: No tremor, atrophy or abnormal muscle tone. Coordination: Coordination normal.      Gait: Gait normal.      Deep Tendon Reflexes: Reflexes are normal and symmetric. Psychiatric:         Speech: Speech normal.         Behavior: Behavior normal.         Thought Content:  Thought content normal.        Patient Active Problem List   Diagnosis

## 2021-08-05 ASSESSMENT — ENCOUNTER SYMPTOMS
VOMITING: 0
SINUS PRESSURE: 0
CHOKING: 0
COLOR CHANGE: 0
EYE ITCHING: 0
BLOOD IN STOOL: 0
RHINORRHEA: 0
EYE PAIN: 0
BACK PAIN: 0
SORE THROAT: 0
SHORTNESS OF BREATH: 0
ABDOMINAL PAIN: 1
PHOTOPHOBIA: 0
ABDOMINAL DISTENTION: 0
COUGH: 0
WHEEZING: 0
CHEST TIGHTNESS: 0
ANAL BLEEDING: 0
ALLERGIC/IMMUNOLOGIC NEGATIVE: 1
CONSTIPATION: 0
APNEA: 0
NAUSEA: 0
EYE DISCHARGE: 0
DIARRHEA: 0

## 2021-08-17 NOTE — PROGRESS NOTES
[address] [facility] [address]  Dept: [phone]  Dept Fax: [phone]  Loc: [phone]    Visit Date: 8/18/2021    Annabel Epps is a 35 y.o. female who presents today for:  Chief Complaint   Patient presents with    Post-Op Check     s/p Robotic repair of incarcerated umbilical hernia with mesh (10 x 15 cm, Ventralight mesh)-7/22/2021 Last seen in the office on 8/4/2021       HPI:     HPI    Calvin Shaikh is a 36-year old female patient who presents today for follow up status post robotic repair of incarcerated umbilical hernia with mesh 4 weeks ago with Dr. Yolanda Jordan. Doing better. Still some abdominal pain from time to time but much better. She has two little ones at home also where she is a full-time mom. Off all narcotics. Wearing abdominal binder still intermittently. Takes Motrin as needed. Abdominal wall incisions are healing well without signs of infection. Appetite improving. No nausea or vomiting. Bowel function normal. Up moving better. Fatigue better. Denies any SOB or chest pain. No lightheadedness or dizziness. No fevers or chills. No calf pain, swelling or tenderness. Urinating without issues. Still has intermittently abdominal wall swelling.     Past Medical History:   Diagnosis Date    OCD (obsessive compulsive disorder)     PONV (postoperative nausea and vomiting)       Past Surgical History:   Procedure Laterality Date    ADENOIDECTOMY      HERNIA REPAIR N/A 3/94/7094    ROBOTIC UMBILICAL HERNIA REPAIR WITH MESH performed by Vinita Armenta MD at [address] [place] [address] BY DIL/EVAC N/A 7/3/2018    DILATATION AND CURETTAGE SUCTION performed by Buddy Arenas MD at [facility] 1998      WISDOM TOOTH EXTRACTION Bilateral 2013       Family History   Problem Relation Age of Onset    Other Neg Hx        Social History     Tobacco Use    Smoking status: Never Smoker  Smokeless tobacco: Never Used   Substance Use Topics    Alcohol use: Yes     Alcohol/week: 0.0 standard drinks     Comment: occasionally      Current Outpatient Medications   Medication Sig Dispense Refill    MONO-LINYAH 0.25-35 MG-MCG per tablet Take 1 tablet by mouth daily       No current facility-administered medications for this visit. Allergies   Allergen Reactions    Bactrim [Sulfamethoxazole-Trimethoprim] Shortness Of Breath and Itching       Subjective:     Review of Systems   Constitutional: Negative for activity change, appetite change, chills, diaphoresis, fatigue, fever and unexpected weight change. HENT: Negative for congestion, dental problem, hearing loss, rhinorrhea, sinus pressure and sore throat. Eyes: Negative for photophobia, pain, discharge, itching and visual disturbance. Respiratory: Negative for apnea, cough, choking, chest tightness, shortness of breath and wheezing. Cardiovascular: Negative for chest pain, palpitations and leg swelling. Gastrointestinal: Positive for abdominal pain. Negative for abdominal distention, anal bleeding, blood in stool, constipation, diarrhea, nausea and vomiting. Endocrine: Negative. Genitourinary: Negative for decreased urine volume, difficulty urinating, dysuria, frequency and urgency. Musculoskeletal: Negative for arthralgias, back pain, gait problem, joint swelling, myalgias and neck pain. Skin: Negative for color change, pallor, rash and wound. Allergic/Immunologic: Negative. Neurological: Negative for dizziness, tremors, weakness, numbness and headaches. Hematological: Negative. Psychiatric/Behavioral: Negative. Objective:   /78 (Site: Right Upper Arm, Position: Sitting, Cuff Size: Medium Adult)   Pulse 106   Temp 96.9 °F (36.1 °C) (Infrared)   Resp 18   Wt 172 lb (78 kg)   LMP 07/28/2021 (Approximate)   SpO2 96%   BMI 24.68 kg/m²     Physical Exam  Vitals reviewed.    Constitutional: General: She is not in acute distress. Appearance: Normal appearance. She is well-developed. She is not ill-appearing or toxic-appearing. HENT:      Head: Normocephalic and atraumatic. Right Ear: Hearing and external ear normal.      Left Ear: Hearing and external ear normal.      Nose: Nose normal.      Mouth/Throat:      Mouth: Mucous membranes are not pale, not dry and not cyanotic. Eyes:      General: Lids are normal.   Neck:      Trachea: Trachea and phonation normal.   Cardiovascular:      Rate and Rhythm: Normal rate and regular rhythm. Pulses: Normal pulses. Heart sounds: S1 normal and S2 normal.   Pulmonary:      Effort: Pulmonary effort is normal. No tachypnea, bradypnea, accessory muscle usage or respiratory distress. Breath sounds: Normal breath sounds. No decreased breath sounds, wheezing or rales. Chest:      Chest wall: No tenderness. Abdominal:      General: Bowel sounds are normal. There is no distension. Palpations: Abdomen is soft. There is no mass. Tenderness: There is abdominal tenderness. Musculoskeletal:         General: No tenderness. Normal range of motion. Cervical back: Normal range of motion and neck supple. Skin:     General: Skin is warm and dry. Findings: No abrasion, bruising, burn, ecchymosis, erythema, laceration, lesion or rash. Neurological:      Mental Status: She is alert and oriented to person, place, and time. Motor: No tremor, atrophy or abnormal muscle tone. Coordination: Coordination normal.      Gait: Gait normal.      Deep Tendon Reflexes: Reflexes are normal and symmetric. Psychiatric:         Speech: Speech normal.         Behavior: Behavior normal.         Thought Content:  Thought content normal.          Patient Active Problem List   Diagnosis    OCD (obsessive compulsive disorder)    Recurrent cystitis    Hypotonic bladder    S/P D&C (status post dilation and curettage)    Vaginal delivery  Spontaneous vaginal delivery    Incarcerated umbilical hernia     Assessment:     1. Status post robotic repair of incarcerated umbilical hernia with mesh    Plan:     1. Incisions are healing well without signs of infection. Continue wound care as directed. 2. No bulge or instability noted at old hernia site. Less swelling. 3. Appetite doing well. Continue diet as tolerated. 4. Bowel function doing well. Stool softeners as needed. 5. Wear abdominal binder for comfort. Off and on as needed throughout the day. 6. Off narcotics. Tylenol and/or Motrin as needed for discomfort. 7. Lifting/activity restrictions discussed with patient. Questions answered. Off restrictions at 6 weeks post op. 8. Follow up as needed. Signs and symptoms reviewed with patient that would be concerning and need her to return to office for re-evaluation. Patient states she will call if she has questions or concerns.       Electronically signed by DARIEN Medeiros CNP on 8/19/2021 at 9:56 AM

## 2021-08-18 ENCOUNTER — OFFICE VISIT (OUTPATIENT)
Dept: SURGERY | Age: 31
End: 2021-08-18

## 2021-08-18 VITALS
TEMPERATURE: 96.9 F | RESPIRATION RATE: 18 BRPM | OXYGEN SATURATION: 96 % | HEART RATE: 106 BPM | WEIGHT: 172 LBS | DIASTOLIC BLOOD PRESSURE: 78 MMHG | SYSTOLIC BLOOD PRESSURE: 116 MMHG | BODY MASS INDEX: 24.68 KG/M2

## 2021-08-18 DIAGNOSIS — Z09 S/P UMBILICAL HERNIA REPAIR, FOLLOW-UP EXAM: Primary | ICD-10-CM

## 2021-08-18 PROCEDURE — 99024 POSTOP FOLLOW-UP VISIT: CPT | Performed by: NURSE PRACTITIONER

## 2021-08-18 NOTE — PATIENT INSTRUCTIONS
Continue wound care. Monitor for redness, swelling, or purulent drainage. No lotions, ointments, alcohol or peroxide to incision sites. Maintain lifting restrictions for the full 6 weeks after surgery. No heavy lifting, pulling, or tugging greater than 25-30 lbs. Gradually ease into normal routine before lifting heavier objects around 9/2-9/16/21. Continue stool softeners as needed. Take Colace or Miralax if needed. Follow up as directed. Do not hesitate to call with any questions or concerns.

## 2021-08-19 ASSESSMENT — ENCOUNTER SYMPTOMS
ABDOMINAL DISTENTION: 0
COLOR CHANGE: 0
CONSTIPATION: 0
APNEA: 0
ALLERGIC/IMMUNOLOGIC NEGATIVE: 1
VOMITING: 0
CHEST TIGHTNESS: 0
EYE ITCHING: 0
ANAL BLEEDING: 0
DIARRHEA: 0
NAUSEA: 0
ABDOMINAL PAIN: 1
RHINORRHEA: 0
COUGH: 0
SINUS PRESSURE: 0
WHEEZING: 0
SHORTNESS OF BREATH: 0
CHOKING: 0
EYE PAIN: 0
BACK PAIN: 0
SORE THROAT: 0
PHOTOPHOBIA: 0
EYE DISCHARGE: 0
BLOOD IN STOOL: 0

## 2021-10-29 ENCOUNTER — HOSPITAL ENCOUNTER (OUTPATIENT)
Age: 31
Discharge: HOME | End: 2021-10-29
Payer: COMMERCIAL

## 2021-10-29 DIAGNOSIS — Z20.822 EXPOSURE TO COVID-19 VIRUS: ICD-10-CM

## 2021-10-29 DIAGNOSIS — Z20.822 EXPOSURE TO COVID-19 VIRUS: Primary | ICD-10-CM

## 2021-10-29 PROCEDURE — U0005 INFEC AGEN DETEC AMPLI PROBE: HCPCS

## 2021-10-29 PROCEDURE — U0003 INFECTIOUS AGENT DETECTION BY NUCLEIC ACID (DNA OR RNA); SEVERE ACUTE RESPIRATORY SYNDROME CORONAVIRUS 2 (SARS-COV-2) (CORONAVIRUS DISEASE [COVID-19]), AMPLIFIED PROBE TECHNIQUE, MAKING USE OF HIGH THROUGHPUT TECHNOLOGIES AS DESCRIBED BY CMS-2020-01-R: HCPCS

## 2021-10-31 LAB
SARS-COV-2: DETECTED
SOURCE: ABNORMAL

## 2021-12-09 ENCOUNTER — E-VISIT (OUTPATIENT)
Dept: INTERNAL MEDICINE CLINIC | Age: 31
End: 2021-12-09
Payer: COMMERCIAL

## 2021-12-09 DIAGNOSIS — B34.9 ACUTE VIRAL SYNDROME: Primary | ICD-10-CM

## 2021-12-09 PROCEDURE — 99421 OL DIG E/M SVC 5-10 MIN: CPT | Performed by: INTERNAL MEDICINE

## 2021-12-09 ASSESSMENT — LIFESTYLE VARIABLES: SMOKING_STATUS: NO, I'VE NEVER SMOKED

## 2021-12-09 NOTE — PROGRESS NOTES
5-10 minutes were spent on the digital evaluation and management of this patient. Diagnoses and associated orders for this visit:     Acute viral syndrome    Your Evisit was sent to me because your provider was not available to respond to your Evisit. Based on the information you provided, it sounds like you may just have a virus. Make sure you get plenty of rest and keep yourself hydrated. Buy some over the counter Mucinex DM 1200 mg 1 pill twice a day to help with congestion and cough by thinning up your secretions while your body works on fighting off the infection. Swollen lymph nodes in your neck could make you feel like you have pain in your ears or throat. Use Tylenol or ibuprofen as needed for pain. If not better, please schedule for an in person or a virtual video visit instead of an Evisit so you can be examined and interact with your provider to better diagnose and treat your symptoms.

## 2022-01-04 ENCOUNTER — NURSE ONLY (OUTPATIENT)
Dept: LAB | Age: 32
End: 2022-01-04

## 2022-01-24 LAB — CYTOLOGY THIN PREP PAP: NORMAL

## 2022-10-13 ASSESSMENT — ENCOUNTER SYMPTOMS
NAUSEA: 0
VOMITING: 0
SORE THROAT: 0
COUGH: 0
EYE REDNESS: 0
SHORTNESS OF BREATH: 0
RHINORRHEA: 0

## 2022-10-13 NOTE — PROGRESS NOTES
[address]  [address] [place] [address]  Phone:  [phone]         Luis M Espinosa       Name: Sarita Craven  : 1990          Chief Complaint:     Chief Complaint   Patient presents with    Established New Doctor    Mass     Back of left side of neck x 4 months. History of Present Illness:      Sarita Craven is a 28 y.o.  female who presents today to establish as a new patient for a health maintenance examination. She's been having pian in the back of her neck for the past 4 months. She first noticed it after working out. She has been seeing a chiropractor and thinks that it gets aggravated after adjustments. No associated numbness or tingling. Health Maintenance  Smoker?:  No  Healthy diet?:  Tries  Routine exercise?:  not routinely besides chasing children  Routine dental exams?:  Yes  Routine eye exams?:  Yes, last 2022  Last PAP:  2022 with Ob/Gyn      Past Medical History:     Past Medical History:   Diagnosis Date    OCD (obsessive compulsive disorder)     PONV (postoperative nausea and vomiting)         Past Surgical History:     Past Surgical History:   Procedure Laterality Date    ADENOIDECTOMY      HERNIA REPAIR N/A     ROBOTIC UMBILICAL HERNIA REPAIR WITH MESH performed by Maxx Perez MD at [address] [facility] BY DIL/EVAC N/A 7/3/2018    DILATATION AND CURETTAGE SUCTION performed by Lety Sharma MD at [address] [facility] EXTRACTION Bilateral 2013        Medications:       Outpatient Medications Prior to Visit   Medication Sig Dispense Refill    [address] 0.25-35 MG-MCG per tablet Take 1 tablet by mouth daily       No facility-administered medications prior to visit.        Allergies:       Bactrim [sulfamethoxazole-trimethoprim]      Social History:     Social:          Social History     Socioeconomic History    Marital status:      Spouse name: Not on file    Number of children: Not on file    Years of education: Not on file    Highest education level: Not on file   Occupational History    Not on file   Tobacco Use    Smoking status: Never    Smokeless tobacco: Never   Vaping Use    Vaping Use: Never used   Substance and Sexual Activity    Alcohol use: Yes     Alcohol/week: 0.0 standard drinks     Comment: occasionally    Drug use: No    Sexual activity: Yes     Partners: Male   Other Topics Concern    Not on file   Social History Narrative    Not on file     Social Determinants of Health     Financial Resource Strain: Low Risk     Difficulty of Paying Living Expenses: Not hard at all   Food Insecurity: No Food Insecurity    Worried About Running Out of Food in the Last Year: Never true    Ran Out of Food in the Last Year: Never true   Transportation Needs: Not on file   Physical Activity: Insufficiently Active    Days of Exercise per Week: 1 day    Minutes of Exercise per Session: 20 min   Stress: Not on file   Social Connections: Not on file   Intimate Partner Violence: Not At Risk    Fear of Current or Ex-Partner: No    Emotionally Abused: No    Physically Abused: No    Sexually Abused: No   Housing Stability: Not on file       Family History:     Family History   Problem Relation Age of Onset    Diabetes Mother     Diabetes Maternal Grandmother     Other Neg Hx        Review of Systems:     Review of Systems   Constitutional:  Negative for chills and fever. HENT:  Negative for rhinorrhea and sore throat. Eyes:  Negative for redness and visual disturbance. Respiratory:  Negative for cough and shortness of breath. Cardiovascular:  Negative for chest pain and palpitations. Gastrointestinal:  Negative for nausea and vomiting. Genitourinary:  Negative for dysuria and frequency. Musculoskeletal:  Negative for arthralgias and myalgias. Neurological:  Negative for weakness and headaches.    Psychiatric/Behavioral:  Negative for decreased concentration and dysphoric mood.        Physical Exam:     Vitals:  Blood pressure 120/80, pulse (!) 103, temperature 98.2 °F (36.8 °C), temperature source Temporal, resp. rate 18, height 5' 10\" (1.778 m), weight 165 lb (74.8 kg), last menstrual period 09/22/2022, not currently breastfeeding. General appearance:  Alert, well appearing, and in no acute distress. Mental status:  Oriented to person, place, and time with normal affect. Head:  Normocephalic and atraumatic. Eyes:   Extraocular eye movements intact, conjunctiva clear. Ears:  Hearing appears to be intact. Nose:  No drainage noted. Mouth:  Mucous membranes moist.  Neck:  Supple, no carotid bruits, thyroid not palpable. Left posterior enlarged lymph node. Heart:  Normal rate, regular rhythm, no murmurs. Lungs:  Clear to auscultation bilaterally. Respirations unlabored. Abdomen:  Soft, nondistended, bowel sounds present all four quadrants. Neurological:  Normal speech. Extremities:  Peripheral pulses palpable, no pedal edema. Skin:  No gross lesions, rashes, or induration noted. Assessment:      Diagnosis Orders   1. Well adult exam        2. Mass of left side of neck  US HEAD NECK SOFT TISSUE THYROID          Plan:     Orders Placed This Encounter   Procedures    US HEAD NECK SOFT TISSUE THYROID     This procedure can be scheduled via [facility]. Access your [facility] account by visiting [email or web address]. Standing Status:   Future     Standing Expiration Date:   10/18/2023         Counseling Completed:  Tobacco and secondary smoke risks reviewed; instructed on cessation and avoidance. Repeat pap every 3 - 5 years if negative for women over the age of 27. Every 3 years under 27years old. Mammograms every 1 year if age > 37 yo and last mammogram was negative. Colon cancer screening reviewed age > 48, or < if indicated. Labs ordered, if indicated. Influenza vaccine recommended, if in season. Pneumonia vaccination if > 65 or indicated.   Routine eye and dental exams advised. Exercise and healthy diet discussed. Return in about 1 year (around 10/18/2023) for well/preventative visit.     Electronically signed by Valentine Jennings MD on 10/18/2022 at 3:02 PM

## 2022-10-15 SDOH — HEALTH STABILITY: PHYSICAL HEALTH: ON AVERAGE, HOW MANY DAYS PER WEEK DO YOU ENGAGE IN MODERATE TO STRENUOUS EXERCISE (LIKE A BRISK WALK)?: 1 DAY

## 2022-10-15 SDOH — HEALTH STABILITY: PHYSICAL HEALTH: ON AVERAGE, HOW MANY MINUTES DO YOU ENGAGE IN EXERCISE AT THIS LEVEL?: 20 MIN

## 2022-10-18 ENCOUNTER — OFFICE VISIT (OUTPATIENT)
Dept: FAMILY MEDICINE CLINIC | Age: 32
End: 2022-10-18
Payer: COMMERCIAL

## 2022-10-18 VITALS
SYSTOLIC BLOOD PRESSURE: 120 MMHG | BODY MASS INDEX: 23.62 KG/M2 | WEIGHT: 165 LBS | DIASTOLIC BLOOD PRESSURE: 80 MMHG | HEIGHT: 70 IN | TEMPERATURE: 98.2 F | HEART RATE: 103 BPM | RESPIRATION RATE: 18 BRPM

## 2022-10-18 DIAGNOSIS — R22.1 MASS OF LEFT SIDE OF NECK: ICD-10-CM

## 2022-10-18 DIAGNOSIS — Z00.00 WELL ADULT EXAM: Primary | ICD-10-CM

## 2022-10-18 PROCEDURE — 99385 PREV VISIT NEW AGE 18-39: CPT | Performed by: FAMILY MEDICINE

## 2022-10-18 SDOH — ECONOMIC STABILITY: FOOD INSECURITY: WITHIN THE PAST 12 MONTHS, THE FOOD YOU BOUGHT JUST DIDN'T LAST AND YOU DIDN'T HAVE MONEY TO GET MORE.: NEVER TRUE

## 2022-10-18 SDOH — ECONOMIC STABILITY: FOOD INSECURITY: WITHIN THE PAST 12 MONTHS, YOU WORRIED THAT YOUR FOOD WOULD RUN OUT BEFORE YOU GOT MONEY TO BUY MORE.: NEVER TRUE

## 2022-10-18 ASSESSMENT — PATIENT HEALTH QUESTIONNAIRE - PHQ9
SUM OF ALL RESPONSES TO PHQ QUESTIONS 1-9: 0
2. FEELING DOWN, DEPRESSED OR HOPELESS: 0
1. LITTLE INTEREST OR PLEASURE IN DOING THINGS: 0
SUM OF ALL RESPONSES TO PHQ9 QUESTIONS 1 & 2: 0
SUM OF ALL RESPONSES TO PHQ QUESTIONS 1-9: 0

## 2022-10-18 ASSESSMENT — SOCIAL DETERMINANTS OF HEALTH (SDOH): HOW HARD IS IT FOR YOU TO PAY FOR THE VERY BASICS LIKE FOOD, HOUSING, MEDICAL CARE, AND HEATING?: NOT HARD AT ALL

## 2022-10-21 ENCOUNTER — HOSPITAL ENCOUNTER (OUTPATIENT)
Age: 32
Discharge: HOME | End: 2022-10-21
Payer: COMMERCIAL

## 2022-10-21 DIAGNOSIS — Z00.00 WELL ADULT EXAM: ICD-10-CM

## 2022-10-21 LAB
ANION GAP SERPL CALCULATED.3IONS-SCNC: 10 MEQ/L (ref 8–16)
BASOPHILS # BLD: 0.5 %
BASOPHILS ABSOLUTE: 0 THOU/MM3 (ref 0–0.1)
BUN BLDV-MCNC: 10 MG/DL (ref 7–22)
CALCIUM SERPL-MCNC: 9.8 MG/DL (ref 8.5–10.5)
CHLORIDE BLD-SCNC: 106 MEQ/L (ref 98–111)
CO2: 26 MEQ/L (ref 23–33)
CREAT SERPL-MCNC: 0.7 MG/DL (ref 0.4–1.2)
EOSINOPHIL # BLD: 1.1 %
EOSINOPHILS ABSOLUTE: 0 THOU/MM3 (ref 0–0.4)
ERYTHROCYTE [DISTWIDTH] IN BLOOD BY AUTOMATED COUNT: 12 % (ref 11.5–14.5)
ERYTHROCYTE [DISTWIDTH] IN BLOOD BY AUTOMATED COUNT: 40.2 FL (ref 35–45)
GFR SERPL CREATININE-BSD FRML MDRD: > 60 ML/MIN/1.73M2
GLUCOSE BLD-MCNC: 97 MG/DL (ref 70–108)
HCT VFR BLD CALC: 43.4 % (ref 37–47)
HEMOGLOBIN: 14 GM/DL (ref 12–16)
IMMATURE GRANS (ABS): 0.01 THOU/MM3 (ref 0–0.07)
IMMATURE GRANULOCYTES: 0.2 %
LYMPHOCYTES # BLD: 38.1 %
LYMPHOCYTES ABSOLUTE: 1.7 THOU/MM3 (ref 1–4.8)
MCH RBC QN AUTO: 29.9 PG (ref 26–33)
MCHC RBC AUTO-ENTMCNC: 32.3 GM/DL (ref 32.2–35.5)
MCV RBC AUTO: 92.5 FL (ref 81–99)
MONOCYTES # BLD: 8.6 %
MONOCYTES ABSOLUTE: 0.4 THOU/MM3 (ref 0.4–1.3)
NUCLEATED RED BLOOD CELLS: 0 /100 WBC
PLATELET # BLD: 272 THOU/MM3 (ref 130–400)
PMV BLD AUTO: 9.3 FL (ref 9.4–12.4)
POTASSIUM SERPL-SCNC: 5.1 MEQ/L (ref 3.5–5.2)
RBC # BLD: 4.69 MILL/MM3 (ref 4.2–5.4)
SEG NEUTROPHILS: 51.5 %
SEGMENTED NEUTROPHILS ABSOLUTE COUNT: 2.3 THOU/MM3 (ref 1.8–7.7)
SODIUM BLD-SCNC: 142 MEQ/L (ref 135–145)
WBC # BLD: 4.4 THOU/MM3 (ref 4.8–10.8)

## 2022-10-21 PROCEDURE — 36415 COLL VENOUS BLD VENIPUNCTURE: CPT

## 2022-10-21 PROCEDURE — 85025 COMPLETE CBC W/AUTO DIFF WBC: CPT

## 2022-10-21 PROCEDURE — 80048 BASIC METABOLIC PNL TOTAL CA: CPT

## 2022-10-24 ENCOUNTER — HOSPITAL ENCOUNTER (OUTPATIENT)
Dept: ULTRASOUND IMAGING | Age: 32
Discharge: HOME | End: 2022-10-24
Payer: COMMERCIAL

## 2022-10-24 DIAGNOSIS — R22.1 MASS OF LEFT SIDE OF NECK: ICD-10-CM

## 2022-10-24 PROCEDURE — 76536 US EXAM OF HEAD AND NECK: CPT

## 2023-01-05 ENCOUNTER — HOSPITAL ENCOUNTER (OUTPATIENT)
Age: 33
Discharge: HOME | End: 2023-01-05
Payer: COMMERCIAL

## 2023-01-05 LAB
ERYTHROCYTE [DISTWIDTH] IN BLOOD BY AUTOMATED COUNT: 12.3 % (ref 11.5–14.5)
ERYTHROCYTE [DISTWIDTH] IN BLOOD BY AUTOMATED COUNT: 41 FL (ref 35–45)
HCT VFR BLD CALC: 39.5 % (ref 37–47)
HEMOGLOBIN: 13.2 GM/DL (ref 12–16)
MCH RBC QN AUTO: 30.3 PG (ref 26–33)
MCHC RBC AUTO-ENTMCNC: 33.4 GM/DL (ref 32.2–35.5)
MCV RBC AUTO: 90.8 FL (ref 81–99)
PLATELET # BLD: 313 THOU/MM3 (ref 130–400)
PMV BLD AUTO: 9.1 FL (ref 9.4–12.4)
RBC # BLD: 4.35 MILL/MM3 (ref 4.2–5.4)
WBC # BLD: 7.3 THOU/MM3 (ref 4.8–10.8)

## 2023-01-05 PROCEDURE — 86592 SYPHILIS TEST NON-TREP QUAL: CPT

## 2023-01-05 PROCEDURE — 86850 RBC ANTIBODY SCREEN: CPT

## 2023-01-05 PROCEDURE — 86803 HEPATITIS C AB TEST: CPT

## 2023-01-05 PROCEDURE — 86901 BLOOD TYPING SEROLOGIC RH(D): CPT

## 2023-01-05 PROCEDURE — 85027 COMPLETE CBC AUTOMATED: CPT

## 2023-01-05 PROCEDURE — 87086 URINE CULTURE/COLONY COUNT: CPT

## 2023-01-05 PROCEDURE — 87340 HEPATITIS B SURFACE AG IA: CPT

## 2023-01-05 PROCEDURE — 86900 BLOOD TYPING SEROLOGIC ABO: CPT

## 2023-01-05 PROCEDURE — 86762 RUBELLA ANTIBODY: CPT

## 2023-01-05 PROCEDURE — 36415 COLL VENOUS BLD VENIPUNCTURE: CPT

## 2023-01-05 PROCEDURE — 87389 HIV-1 AG W/HIV-1&-2 AB AG IA: CPT

## 2023-01-06 LAB
ABO: NORMAL
ANTIBODY SCREEN: NORMAL
HEPATITIS B SURFACE ANTIGEN: NEGATIVE
HEPATITIS C ANTIBODY: NEGATIVE
HIV AG/AB: NONREACTIVE
RH FACTOR: NORMAL
RPR: NONREACTIVE
RUBELLA: 57.3 IU/ML

## 2023-01-07 LAB — URINE CULTURE, ROUTINE: NORMAL

## 2023-01-08 LAB
APTIMA MEDIA TYPE: NORMAL
CHLAMYDIA TRACHOMATIS AMPLIFIED DET: NEGATIVE
NEISSERIA GONORRHOEAE BY AMP: NEGATIVE
SPECIMEN SOURCE: NORMAL
T. VAGINALIS SPECIMEN SOURCE: NORMAL
TRICHOMONAS VAGINALIS BY NAA: NEGATIVE

## 2023-07-20 ENCOUNTER — ANESTHESIA (OUTPATIENT)
Dept: LABOR AND DELIVERY | Age: 33
End: 2023-07-20
Payer: COMMERCIAL

## 2023-07-20 ENCOUNTER — HOSPITAL ENCOUNTER (INPATIENT)
Age: 33
LOS: 1 days | Discharge: HOME | End: 2023-07-21
Attending: OBSTETRICS & GYNECOLOGY | Admitting: OBSTETRICS & GYNECOLOGY
Payer: COMMERCIAL

## 2023-07-20 ENCOUNTER — APPOINTMENT (OUTPATIENT)
Dept: LABOR AND DELIVERY | Age: 33
End: 2023-07-20
Payer: COMMERCIAL

## 2023-07-20 ENCOUNTER — ANESTHESIA EVENT (OUTPATIENT)
Dept: LABOR AND DELIVERY | Age: 33
End: 2023-07-20
Payer: COMMERCIAL

## 2023-07-20 PROBLEM — Z34.90 ENCOUNTER FOR ELECTIVE INDUCTION OF LABOR: Status: ACTIVE | Noted: 2023-07-20

## 2023-07-20 LAB
ABO: NORMAL
AMPHETAMINES UR QL SCN: NEGATIVE
ANTIBODY SCREEN: NORMAL
BARBITURATES UR QL SCN: NEGATIVE
BASOPHILS ABSOLUTE: 0 THOU/MM3 (ref 0–0.1)
BASOPHILS NFR BLD AUTO: 0.6 %
BENZODIAZ UR QL SCN: NEGATIVE
BZE UR QL SCN: NEGATIVE
CANNABINOIDS UR QL SCN: NEGATIVE
DEPRECATED RDW RBC AUTO: 42 FL (ref 35–45)
EOSINOPHIL NFR BLD AUTO: 0.7 %
EOSINOPHILS ABSOLUTE: 0.1 THOU/MM3 (ref 0–0.4)
ERYTHROCYTE [DISTWIDTH] IN BLOOD BY AUTOMATED COUNT: 12.8 % (ref 11.5–14.5)
FENTANYL: NEGATIVE
HCT VFR BLD AUTO: 38.9 % (ref 37–47)
HGB BLD-MCNC: 13.1 GM/DL (ref 12–16)
IMM GRANULOCYTES # BLD AUTO: 0.11 THOU/MM3 (ref 0–0.07)
IMM GRANULOCYTES NFR BLD AUTO: 1.5 %
LYMPHOCYTES ABSOLUTE: 2.1 THOU/MM3 (ref 1–4.8)
LYMPHOCYTES NFR BLD AUTO: 28.7 %
MCH RBC QN AUTO: 30.5 PG (ref 26–33)
MCHC RBC AUTO-ENTMCNC: 33.7 GM/DL (ref 32.2–35.5)
MCV RBC AUTO: 90.5 FL (ref 81–99)
MONOCYTES ABSOLUTE: 0.5 THOU/MM3 (ref 0.4–1.3)
MONOCYTES NFR BLD AUTO: 6.5 %
NEUTROPHILS NFR BLD AUTO: 62 %
NRBC BLD AUTO-RTO: 0 /100 WBC
OPIATES UR QL SCN: NEGATIVE
OXYCODONE, OPI5M: NEGATIVE
PCP UR QL SCN: NEGATIVE
PLATELET # BLD AUTO: 209 THOU/MM3 (ref 130–400)
PMV BLD AUTO: 10 FL (ref 9.4–12.4)
RBC # BLD AUTO: 4.3 MILL/MM3 (ref 4.2–5.4)
RH FACTOR: NORMAL
RPR SER QL: NONREACTIVE
SEGMENTED NEUTROPHILS ABSOLUTE COUNT: 4.5 THOU/MM3 (ref 1.8–7.7)
WBC # BLD AUTO: 7.2 THOU/MM3 (ref 4.8–10.8)

## 2023-07-20 PROCEDURE — 85025 COMPLETE CBC W/AUTO DIFF WBC: CPT

## 2023-07-20 PROCEDURE — 86901 BLOOD TYPING SEROLOGIC RH(D): CPT

## 2023-07-20 PROCEDURE — 6360000002 HC RX W HCPCS: Performed by: OBSTETRICS & GYNECOLOGY

## 2023-07-20 PROCEDURE — 7200000001 HC VAGINAL DELIVERY

## 2023-07-20 PROCEDURE — 1220000000 HC SEMI PRIVATE OB R&B

## 2023-07-20 PROCEDURE — 86850 RBC ANTIBODY SCREEN: CPT

## 2023-07-20 PROCEDURE — 80307 DRUG TEST PRSMV CHEM ANLYZR: CPT

## 2023-07-20 PROCEDURE — 86592 SYPHILIS TEST NON-TREP QUAL: CPT

## 2023-07-20 PROCEDURE — 2580000003 HC RX 258: Performed by: OBSTETRICS & GYNECOLOGY

## 2023-07-20 PROCEDURE — 6360000002 HC RX W HCPCS: Performed by: NURSE ANESTHETIST, CERTIFIED REGISTERED

## 2023-07-20 PROCEDURE — 3700000025 EPIDURAL BLOCK: Performed by: STUDENT IN AN ORGANIZED HEALTH CARE EDUCATION/TRAINING PROGRAM

## 2023-07-20 PROCEDURE — 6370000000 HC RX 637 (ALT 250 FOR IP): Performed by: OBSTETRICS & GYNECOLOGY

## 2023-07-20 PROCEDURE — 86900 BLOOD TYPING SEROLOGIC ABO: CPT

## 2023-07-20 PROCEDURE — 3E033VJ INTRODUCTION OF OTHER HORMONE INTO PERIPHERAL VEIN, PERCUTANEOUS APPROACH: ICD-10-PCS | Performed by: OBSTETRICS & GYNECOLOGY

## 2023-07-20 PROCEDURE — 2500000003 HC RX 250 WO HCPCS: Performed by: STUDENT IN AN ORGANIZED HEALTH CARE EDUCATION/TRAINING PROGRAM

## 2023-07-20 PROCEDURE — 10907ZC DRAINAGE OF AMNIOTIC FLUID, THERAPEUTIC FROM PRODUCTS OF CONCEPTION, VIA NATURAL OR ARTIFICIAL OPENING: ICD-10-PCS | Performed by: OBSTETRICS & GYNECOLOGY

## 2023-07-20 RX ORDER — SODIUM CHLORIDE 9 MG/ML
INJECTION, SOLUTION INTRAVENOUS PRN
Status: DISCONTINUED | OUTPATIENT
Start: 2023-07-20 | End: 2023-07-21 | Stop reason: HOSPADM

## 2023-07-20 RX ORDER — FERROUS SULFATE 325(65) MG
325 TABLET ORAL
Status: DISCONTINUED | OUTPATIENT
Start: 2023-07-21 | End: 2023-07-21 | Stop reason: HOSPADM

## 2023-07-20 RX ORDER — METHYLERGONOVINE MALEATE 0.2 MG/ML
200 INJECTION INTRAVENOUS PRN
Status: DISCONTINUED | OUTPATIENT
Start: 2023-07-20 | End: 2023-07-21 | Stop reason: HOSPADM

## 2023-07-20 RX ORDER — OXYTOCIN/0.9 % SODIUM CHLORIDE 30/500 ML
87.3 PLASTIC BAG, INJECTION (ML) INTRAVENOUS PRN
Status: DISCONTINUED | OUTPATIENT
Start: 2023-07-20 | End: 2023-07-20

## 2023-07-20 RX ORDER — DIPHENHYDRAMINE HCL 25 MG
25 TABLET ORAL EVERY 6 HOURS PRN
Status: DISCONTINUED | OUTPATIENT
Start: 2023-07-20 | End: 2023-07-21 | Stop reason: HOSPADM

## 2023-07-20 RX ORDER — TRANEXAMIC ACID 10 MG/ML
1000 INJECTION, SOLUTION INTRAVENOUS
Status: DISCONTINUED | OUTPATIENT
Start: 2023-07-20 | End: 2023-07-20 | Stop reason: HOSPADM

## 2023-07-20 RX ORDER — BUTORPHANOL TARTRATE 1 MG/ML
1 INJECTION, SOLUTION INTRAMUSCULAR; INTRAVENOUS
Status: DISCONTINUED | OUTPATIENT
Start: 2023-07-20 | End: 2023-07-20 | Stop reason: HOSPADM

## 2023-07-20 RX ORDER — MORPHINE SULFATE 2 MG/ML
2 INJECTION, SOLUTION INTRAMUSCULAR; INTRAVENOUS
Status: DISCONTINUED | OUTPATIENT
Start: 2023-07-20 | End: 2023-07-21 | Stop reason: HOSPADM

## 2023-07-20 RX ORDER — ONDANSETRON 4 MG/1
8 TABLET, ORALLY DISINTEGRATING ORAL EVERY 8 HOURS PRN
Status: DISCONTINUED | OUTPATIENT
Start: 2023-07-20 | End: 2023-07-21 | Stop reason: HOSPADM

## 2023-07-20 RX ORDER — ONDANSETRON 2 MG/ML
8 INJECTION INTRAMUSCULAR; INTRAVENOUS EVERY 6 HOURS PRN
Status: DISCONTINUED | OUTPATIENT
Start: 2023-07-20 | End: 2023-07-20 | Stop reason: HOSPADM

## 2023-07-20 RX ORDER — SODIUM CHLORIDE, SODIUM LACTATE, POTASSIUM CHLORIDE, CALCIUM CHLORIDE 600; 310; 30; 20 MG/100ML; MG/100ML; MG/100ML; MG/100ML
INJECTION, SOLUTION INTRAVENOUS CONTINUOUS
Status: DISCONTINUED | OUTPATIENT
Start: 2023-07-20 | End: 2023-07-20

## 2023-07-20 RX ORDER — ZOLPIDEM TARTRATE 5 MG/1
10 TABLET ORAL NIGHTLY PRN
Status: DISCONTINUED | OUTPATIENT
Start: 2023-07-20 | End: 2023-07-21 | Stop reason: HOSPADM

## 2023-07-20 RX ORDER — MODIFIED LANOLIN
OINTMENT (GRAM) TOPICAL PRN
Status: DISCONTINUED | OUTPATIENT
Start: 2023-07-20 | End: 2023-07-21 | Stop reason: HOSPADM

## 2023-07-20 RX ORDER — DIPHENHYDRAMINE HYDROCHLORIDE 50 MG/ML
25 INJECTION INTRAMUSCULAR; INTRAVENOUS EVERY 4 HOURS PRN
Status: DISCONTINUED | OUTPATIENT
Start: 2023-07-20 | End: 2023-07-20 | Stop reason: HOSPADM

## 2023-07-20 RX ORDER — SODIUM CHLORIDE, SODIUM LACTATE, POTASSIUM CHLORIDE, CALCIUM CHLORIDE 600; 310; 30; 20 MG/100ML; MG/100ML; MG/100ML; MG/100ML
INJECTION, SOLUTION INTRAVENOUS CONTINUOUS
Status: DISCONTINUED | OUTPATIENT
Start: 2023-07-20 | End: 2023-07-21 | Stop reason: HOSPADM

## 2023-07-20 RX ORDER — ONDANSETRON 2 MG/ML
4 INJECTION INTRAMUSCULAR; INTRAVENOUS EVERY 6 HOURS PRN
Status: DISCONTINUED | OUTPATIENT
Start: 2023-07-20 | End: 2023-07-21 | Stop reason: HOSPADM

## 2023-07-20 RX ORDER — SODIUM CHLORIDE, SODIUM LACTATE, POTASSIUM CHLORIDE, AND CALCIUM CHLORIDE .6; .31; .03; .02 G/100ML; G/100ML; G/100ML; G/100ML
500 INJECTION, SOLUTION INTRAVENOUS PRN
Status: DISCONTINUED | OUTPATIENT
Start: 2023-07-20 | End: 2023-07-20 | Stop reason: HOSPADM

## 2023-07-20 RX ORDER — ACETAMINOPHEN 325 MG/1
650 TABLET ORAL EVERY 4 HOURS PRN
Status: DISCONTINUED | OUTPATIENT
Start: 2023-07-20 | End: 2023-07-20 | Stop reason: HOSPADM

## 2023-07-20 RX ORDER — MISOPROSTOL 200 UG/1
1000 TABLET ORAL PRN
Status: DISCONTINUED | OUTPATIENT
Start: 2023-07-20 | End: 2023-07-21 | Stop reason: HOSPADM

## 2023-07-20 RX ORDER — MORPHINE SULFATE 2 MG/ML
2 INJECTION, SOLUTION INTRAMUSCULAR; INTRAVENOUS
Status: DISCONTINUED | OUTPATIENT
Start: 2023-07-20 | End: 2023-07-20 | Stop reason: HOSPADM

## 2023-07-20 RX ORDER — MISOPROSTOL 200 UG/1
1000 TABLET ORAL PRN
Status: DISCONTINUED | OUTPATIENT
Start: 2023-07-20 | End: 2023-07-20 | Stop reason: HOSPADM

## 2023-07-20 RX ORDER — FENTANYL CITRATE 50 UG/ML
50 INJECTION, SOLUTION INTRAMUSCULAR; INTRAVENOUS
Status: DISCONTINUED | OUTPATIENT
Start: 2023-07-20 | End: 2023-07-20 | Stop reason: HOSPADM

## 2023-07-20 RX ORDER — SODIUM CHLORIDE 0.9 % (FLUSH) 0.9 %
5-40 SYRINGE (ML) INJECTION EVERY 12 HOURS SCHEDULED
Status: DISCONTINUED | OUTPATIENT
Start: 2023-07-20 | End: 2023-07-21

## 2023-07-20 RX ORDER — IBUPROFEN 800 MG/1
800 TABLET ORAL EVERY 8 HOURS
Status: DISCONTINUED | OUTPATIENT
Start: 2023-07-20 | End: 2023-07-21 | Stop reason: HOSPADM

## 2023-07-20 RX ORDER — MORPHINE SULFATE 4 MG/ML
4 INJECTION, SOLUTION INTRAMUSCULAR; INTRAVENOUS
Status: DISCONTINUED | OUTPATIENT
Start: 2023-07-20 | End: 2023-07-21 | Stop reason: HOSPADM

## 2023-07-20 RX ORDER — CARBOPROST TROMETHAMINE 250 UG/ML
250 INJECTION, SOLUTION INTRAMUSCULAR PRN
Status: DISCONTINUED | OUTPATIENT
Start: 2023-07-20 | End: 2023-07-21 | Stop reason: HOSPADM

## 2023-07-20 RX ORDER — ACETAMINOPHEN 500 MG
1000 TABLET ORAL EVERY 8 HOURS SCHEDULED
Status: DISCONTINUED | OUTPATIENT
Start: 2023-07-20 | End: 2023-07-21 | Stop reason: HOSPADM

## 2023-07-20 RX ORDER — ONDANSETRON 2 MG/ML
4 INJECTION INTRAMUSCULAR; INTRAVENOUS EVERY 6 HOURS PRN
Status: DISCONTINUED | OUTPATIENT
Start: 2023-07-20 | End: 2023-07-20 | Stop reason: SDUPTHER

## 2023-07-20 RX ORDER — CARBOPROST TROMETHAMINE 250 UG/ML
250 INJECTION, SOLUTION INTRAMUSCULAR PRN
Status: DISCONTINUED | OUTPATIENT
Start: 2023-07-20 | End: 2023-07-20 | Stop reason: HOSPADM

## 2023-07-20 RX ORDER — MORPHINE SULFATE 4 MG/ML
4 INJECTION, SOLUTION INTRAMUSCULAR; INTRAVENOUS
Status: DISCONTINUED | OUTPATIENT
Start: 2023-07-20 | End: 2023-07-20 | Stop reason: HOSPADM

## 2023-07-20 RX ORDER — NALOXONE HYDROCHLORIDE 0.4 MG/ML
INJECTION, SOLUTION INTRAMUSCULAR; INTRAVENOUS; SUBCUTANEOUS PRN
Status: DISCONTINUED | OUTPATIENT
Start: 2023-07-20 | End: 2023-07-20 | Stop reason: HOSPADM

## 2023-07-20 RX ORDER — OXYCODONE HYDROCHLORIDE 5 MG/1
10 TABLET ORAL EVERY 4 HOURS PRN
Status: DISCONTINUED | OUTPATIENT
Start: 2023-07-20 | End: 2023-07-21 | Stop reason: HOSPADM

## 2023-07-20 RX ORDER — ROPIVACAINE HYDROCHLORIDE 2 MG/ML
INJECTION, SOLUTION EPIDURAL; INFILTRATION; PERINEURAL PRN
Status: DISCONTINUED | OUTPATIENT
Start: 2023-07-20 | End: 2023-07-20 | Stop reason: SDUPTHER

## 2023-07-20 RX ORDER — SODIUM CHLORIDE, SODIUM LACTATE, POTASSIUM CHLORIDE, AND CALCIUM CHLORIDE .6; .31; .03; .02 G/100ML; G/100ML; G/100ML; G/100ML
1000 INJECTION, SOLUTION INTRAVENOUS PRN
Status: DISCONTINUED | OUTPATIENT
Start: 2023-07-20 | End: 2023-07-20 | Stop reason: HOSPADM

## 2023-07-20 RX ORDER — DOCUSATE SODIUM 100 MG/1
100 CAPSULE, LIQUID FILLED ORAL 2 TIMES DAILY PRN
Status: DISCONTINUED | OUTPATIENT
Start: 2023-07-20 | End: 2023-07-21 | Stop reason: HOSPADM

## 2023-07-20 RX ORDER — SODIUM CHLORIDE 0.9 % (FLUSH) 0.9 %
5-40 SYRINGE (ML) INJECTION PRN
Status: DISCONTINUED | OUTPATIENT
Start: 2023-07-20 | End: 2023-07-21 | Stop reason: HOSPADM

## 2023-07-20 RX ORDER — LIDOCAINE HYDROCHLORIDE 10 MG/ML
30 INJECTION, SOLUTION INFILTRATION; PERINEURAL PRN
Status: DISCONTINUED | OUTPATIENT
Start: 2023-07-20 | End: 2023-07-20 | Stop reason: HOSPADM

## 2023-07-20 RX ORDER — FAMOTIDINE 20 MG/1
20 TABLET, FILM COATED ORAL 2 TIMES DAILY PRN
Status: DISCONTINUED | OUTPATIENT
Start: 2023-07-20 | End: 2023-07-21 | Stop reason: HOSPADM

## 2023-07-20 RX ORDER — IBUPROFEN 800 MG/1
800 TABLET ORAL EVERY 8 HOURS PRN
Status: DISCONTINUED | OUTPATIENT
Start: 2023-07-20 | End: 2023-07-20 | Stop reason: HOSPADM

## 2023-07-20 RX ORDER — METHYLERGONOVINE MALEATE 0.2 MG/ML
200 INJECTION INTRAVENOUS PRN
Status: DISCONTINUED | OUTPATIENT
Start: 2023-07-20 | End: 2023-07-20 | Stop reason: HOSPADM

## 2023-07-20 RX ORDER — ROPIVACAINE HYDROCHLORIDE 2 MG/ML
INJECTION, SOLUTION EPIDURAL; INFILTRATION; PERINEURAL
Status: COMPLETED
Start: 2023-07-20 | End: 2023-07-20

## 2023-07-20 RX ORDER — SEVOFLURANE 250 ML/250ML
1 LIQUID RESPIRATORY (INHALATION) CONTINUOUS PRN
Status: DISCONTINUED | OUTPATIENT
Start: 2023-07-20 | End: 2023-07-20 | Stop reason: HOSPADM

## 2023-07-20 RX ORDER — OXYTOCIN/0.9 % SODIUM CHLORIDE 30/500 ML
1 PLASTIC BAG, INJECTION (ML) INTRAVENOUS CONTINUOUS
Status: DISCONTINUED | OUTPATIENT
Start: 2023-07-20 | End: 2023-07-20

## 2023-07-20 RX ORDER — OXYCODONE HYDROCHLORIDE 5 MG/1
5 TABLET ORAL EVERY 4 HOURS PRN
Status: DISCONTINUED | OUTPATIENT
Start: 2023-07-20 | End: 2023-07-21 | Stop reason: HOSPADM

## 2023-07-20 RX ORDER — TERBUTALINE SULFATE 1 MG/ML
0.25 INJECTION, SOLUTION SUBCUTANEOUS
Status: DISCONTINUED | OUTPATIENT
Start: 2023-07-20 | End: 2023-07-20 | Stop reason: HOSPADM

## 2023-07-20 RX ADMIN — ACETAMINOPHEN 1000 MG: 500 TABLET ORAL at 21:52

## 2023-07-20 RX ADMIN — SODIUM CHLORIDE, POTASSIUM CHLORIDE, SODIUM LACTATE AND CALCIUM CHLORIDE: 600; 310; 30; 20 INJECTION, SOLUTION INTRAVENOUS at 11:51

## 2023-07-20 RX ADMIN — SODIUM CHLORIDE, POTASSIUM CHLORIDE, SODIUM LACTATE AND CALCIUM CHLORIDE: 600; 310; 30; 20 INJECTION, SOLUTION INTRAVENOUS at 05:35

## 2023-07-20 RX ADMIN — IBUPROFEN 800 MG: 800 TABLET ORAL at 20:18

## 2023-07-20 RX ADMIN — Medication 1 MILLI-UNITS/MIN: at 11:11

## 2023-07-20 RX ADMIN — ROPIVACAINE HYDROCHLORIDE 10 ML: 2 INJECTION, SOLUTION EPIDURAL; INFILTRATION at 11:15

## 2023-07-20 RX ADMIN — Medication 18 ML/HR: at 11:16

## 2023-07-20 RX ADMIN — Medication 1 MILLI-UNITS/MIN: at 05:58

## 2023-07-20 RX ADMIN — Medication 6 MILLI-UNITS/MIN: at 12:52

## 2023-07-20 ASSESSMENT — PAIN SCALES - GENERAL
PAINLEVEL_OUTOF10: 3
PAINLEVEL_OUTOF10: 3

## 2023-07-20 ASSESSMENT — PAIN - FUNCTIONAL ASSESSMENT
PAIN_FUNCTIONAL_ASSESSMENT: ACTIVITIES ARE NOT PREVENTED
PAIN_FUNCTIONAL_ASSESSMENT: ACTIVITIES ARE NOT PREVENTED

## 2023-07-20 ASSESSMENT — PAIN DESCRIPTION - DESCRIPTORS
DESCRIPTORS: ACHING
DESCRIPTORS: ACHING

## 2023-07-20 ASSESSMENT — PAIN DESCRIPTION - LOCATION
LOCATION: BACK
LOCATION: BACK

## 2023-07-20 NOTE — H&P
[facility]  History and Physical Update    Pt Name: Siobhan Melgoza  MRN: [id number]  YOB: 1990  Date of evaluation: 2023    [] I have examined the patient and reviewed the H&P/Consult and there are no changes to the patient or plans. [x] I have examined the patient and reviewed the H&P/Consult and have noted the following changes:  32 yo  at 39 weeks. Here for elective IOL  Cvx 2-360 -2  FHTs reactive  Ctx q 6  Anticipate       Discussion with the patient and/ or family for proposed care, treatment, services; benefits, risks, side effects; likelihood of achieving goals and potential problems that may occur during recuperation was had and all questions were answered. Discussion with the patient and/ or family of reasonable alternatives to the proposed care, treatment, services and the discussion of the risks, benefits, side effects related to the alternatives and the risk related to not receiving the proposed care treatment services was also had and all questions were answered. If this is for an elective surgical procedure then The patient was counseled at length about the risks of catherine Covid-19 during their perioperative period and any recovery window from their procedure. The patient was made aware that catherine Covid-19  may worsen their prognosis for recovering from their procedure  and lend to a higher morbidity and/or mortality risk. All material risks, benefits, and reasonable alternatives including postponing the procedure were discussed. The patient  does wish to proceed with the procedure at this time.              Angle Morales MD,MD  Electronically signed 2023 at 9:06 AM

## 2023-07-20 NOTE — L&D DELIVERY NOTE
Downey, Baby Maurisio Whitley [[id number]]      Labor Events     Labor: No   Steroids: None  Cervical Ripening Date/Time:      Antibiotics Received during Labor: No  Rupture Date/Time:  23 09:02:00   Rupture Type: AROM  Fluid Color: Clear  Fluid Odor: None  Fluid Volume: Scant  Induction: Oxytocin  Augmentation: AROM  Labor Complications: None              Anesthesia    Method: Epidural       Labor Event Times      Labor onset date/time:  23 09:30:00     Dilation complete date/time:  23 14:47:00 EDT     Start pushing date/time:  2023 15:03:00   Decision date/time (emergent ):            Delivery Details      Delivery Date: 23 Delivery Time: 15:11:00   Delivery Type: Vaginal, Spontaneous              [place] Presentation    Presentation: Vertex  _: Occiput  _: Anterior       Shoulder Dystocia    Shoulder Dystocia Present?: No       Assisted Delivery Details    Forceps Attempted?: No  Vacuum Extractor Attempted?: No                           Cord    Complications: Nuchal Loose  Delayed Cord Clamping?: Yes  Cord Blood Disposition: Lab  Gases Sent?: No              Placenta           Lacerations           Vaginal Counts    Initial Count Personnel: Laury Ko RN (1 NEEDLE,10 SPONGES,16 INSTRUMENTS,1 VACUUM)  Intial Sponge Count: Correct Intial Needles Count: Correct Intial Instruments Count: Correct          Blood Loss  Mother: Garrison Gilbert #[id number]     Start of Mother's Information      Delivery Blood Loss  23 0930 - 23 1525      None                 End of Mother's Information  Mother: Garrison Gilbert #[id number]                Delivery Providers    Delivering clinician: Moisés Farley MD     Provider Role    Moisés Farley MD Obstetrician    Asia Farrar RN Primary Nurse    Colette Moreno RN Primary  Nurse     Neonatologist    Barbie Shafer RCP Respiratory Therapist (Day)    Zhen Norris APRN - CRNA Nurse Anesthetist

## 2023-07-20 NOTE — FLOWSHEET NOTE
Dr. Keli Henao returned paged. Updated on her morning induction. Provided SVE and reactive strip. No other questions or concerns at this time. Will continue to work towards vaginal delivery.

## 2023-07-20 NOTE — FLOWSHEET NOTE
Pt 2 hour recovery period is complete. Minimal help up to b/r;void qs. Pericare completed by pt with water bottle. Clean pad, panties and gown on. To wheelchair. Report given to [facility]. Pt will be wheeled out to room 5A18 per wheelchair with baby in arms.

## 2023-07-20 NOTE — FLOWSHEET NOTE
male over intact perineum. Clean pad with ice pack to perineum. Bleeding is small amt. Pain is 0. Baby is skin to skin at this time.

## 2023-07-20 NOTE — FLOWSHEET NOTE
Patient presents to the unit for elective induction.  39 weeks. Patient of Dr. Kriss Snellen. Patient is feeling baby move. Denies pain or leakage of blood or fluid. Patient to bathroom to void, informed of maternal drug testing policy in place on all laboring patients. Verbal consent received, paper consent to be signed and urine to be sent. No other questions or concerns at this time. FOB at bedside. Call light within reach.

## 2023-07-20 NOTE — FLOWSHEET NOTE
Pt sitting on edge of bed, Rufina Holstein CRNA in, pts iv infiltrated, discontinued, Rufina Holstein attempted iv start x2, at [employer] rn in and attempt x2, at [phone] called ER and they will send someone up, at [phone] nurse from ER in to start iv

## 2023-07-20 NOTE — PLAN OF CARE
Problem: Postpartum  Goal: Experiences normal postpartum course  Description:  Postpartum OB-Pregnancy care plan goal which identifies if the mother is experiencing a normal postpartum course  Outcome: Progressing  Note: Patient is having a normal postpartum course. Problem: Postpartum  Goal: Appropriate maternal -  bonding  Description:  Postpartum OB-Pregnancy care plan goal which identifies if the mother and  are bonding appropriately  Outcome: Progressing  Note: Bonding with baby, participating in infant care. Problem: Postpartum  Goal: Establishment of infant feeding pattern  Description:  Postpartum OB-Pregnancy care plan goal which identifies if the mother is establishing a feeding pattern with their   Outcome: Progressing  Note: Infant has a normal feeding pattern. Problem: Postpartum  Goal: Incisions, wounds, or drain sites healing without S/S of infection  Outcome: Progressing  Note: Patient is free from sign/symptoms of infection. Problem: Pain  Goal: Verbalizes/displays adequate comfort level or baseline comfort level  Outcome: Progressing  Flowsheets  Taken 2023 1740 by Scarlet Burton RN  Verbalizes/displays adequate comfort level or baseline comfort level:   Encourage patient to monitor pain and request assistance   Assess pain using appropriate pain scale   Administer analgesics based on type and severity of pain and evaluate response   Implement non-pharmacological measures as appropriate and evaluate response  Taken 2023 0506 by Adriana Campebll RN  Verbalizes/displays adequate comfort level or baseline comfort level:   Encourage patient to monitor pain and request assistance   Assess pain using appropriate pain scale   Implement non-pharmacological measures as appropriate and evaluate response   Consider cultural and social influences on pain and pain management  Note: Pain controlled with po meds.  Discussed ice for perineal pain or the use of warm blanket/heating pad for uterine cramps. Pt states her pain goal 5/10 has been met. Problem: Infection - Adult  Goal: Absence of infection at discharge  Outcome: Progressing  Flowsheets (Taken 7/20/2023 0506 by Faye Villegas RN)  Absence of infection at discharge:   Assess and monitor for signs and symptoms of infection   Monitor lab/diagnostic results   Monitor all insertion sites i.e., indwelling lines, tubes and drains   Administer medications as ordered   Instruct and encourage patient and family to use good hand hygiene technique  Note: Patient shows no sign/symptoms of infection. Problem: Safety - Adult  Goal: Free from fall injury  Outcome: Progressing  Flowsheets  Taken 7/20/2023 1740 by Gayathri Denson RN  Free From Fall Injury: Instruct family/caregiver on patient safety  Taken 7/20/2023 0506 by Faye Villegas RN  Free From Fall Injury: Instruct family/caregiver on patient safety  Note: Patient is free from falls and injury. Problem: Discharge Planning  Goal: Discharge to home or other facility with appropriate resources  Outcome: Progressing  Note: Remains in hospital, discussed possible discharge needs. Care plan reviewed with patient and she contributes to goal setting and voices understanding of plan of care.

## 2023-07-20 NOTE — PLAN OF CARE
Problem: Vaginal Birth or  Section  Goal: Fetal and maternal status remain reassuring during the birth process  Description:  Birth OB-Pregnancy care plan goal which identifies if the fetal and maternal status remain reassuring during the birth process  2023 by Mary Tobias RN  Outcome: Progressing     Problem: Pain  Goal: Verbalizes/displays adequate comfort level or baseline comfort level  2023 by Mary Tobias RN  Outcome: Progressing     Problem: Infection - Adult  Goal: Absence of infection at discharge  2023 by Mary Tobias RN  Outcome: Progressing     Problem: Safety - Adult  Goal: Free from fall injury  2023 by Mary Tobias RN  Outcome: Progressing   Care plan reviewed with patient and Judith Amador. Patient and Judith Amador verbalize understanding of the plan of care and contribute to goal setting.

## 2023-07-20 NOTE — FLOWSHEET NOTE
Patient admitted to [address] [place] [address] per wheelchair with infant in her arms. Oriented to room and call light system. Denies pain or needs at this time.

## 2023-07-20 NOTE — ANESTHESIA PROCEDURE NOTES
Epidural Block    Patient location during procedure: OB  Start time: 7/20/2023 11:02 AM  End time: 7/20/2023 11:18 AM  Reason for block: labor epidural  Staffing  Performed: resident/CRNA   Anesthesiologist: Olivia Zarate DO  Resident/CRNA: Zhen Norris APRN - CRNA  Epidural  Patient position: sitting  Prep: ChloraPrep  Patient monitoring: continuous pulse ox and frequent blood pressure checks  Approach: midline  Location: L2-3  Injection technique: JOSÉ saline  Guidance: paresthesia technique  Provider prep: sterile gloves and mask  Needle  Needle type: Tuohy   Needle gauge: 18 G  Needle length: 3.5 in  Needle insertion depth: 5 cm  Catheter type: side hole  Catheter size: 20 G  Catheter at skin depth: 15 cm  Test dose: negativeCatheter Secured: tegaderm and tape  Assessment  Hemodynamics: stable  Attempts: 1  Outcomes: uncomplicated and patient tolerated procedure well  Preanesthetic Checklist  Completed: patient identified, IV checked, site marked, risks and benefits discussed, surgical/procedural consents, equipment checked, pre-op evaluation, timeout performed, anesthesia consent given, oxygen available, monitors applied/VS acknowledged, fire risk safety assessment completed and verbalized and blood product R/B/A discussed and consented

## 2023-07-20 NOTE — PLAN OF CARE
Problem: Vaginal Birth or  Section  Goal: Fetal and maternal status remain reassuring during the birth process  Description:  Birth OB-Pregnancy care plan goal which identifies if the fetal and maternal status remain reassuring during the birth process  Outcome: Progressing  Flowsheets (Taken 2023 050)  Fetal and Maternal Status Remain Reassuring During the Birth Process:   Monitor vital signs   Monitor fetal heart rate   Monitor uterine activity   Monitor labor progression (Vaginal delivery)     Problem: Pain  Goal: Verbalizes/displays adequate comfort level or baseline comfort level  Outcome: Progressing  Flowsheets (Taken 2023 050)  Verbalizes/displays adequate comfort level or baseline comfort level:   Encourage patient to monitor pain and request assistance   Assess pain using appropriate pain scale   Implement non-pharmacological measures as appropriate and evaluate response   Consider cultural and social influences on pain and pain management     Problem: Infection - Adult  Goal: Absence of infection at discharge  Outcome: Progressing  Flowsheets (Taken 2023)  Absence of infection at discharge:   Assess and monitor for signs and symptoms of infection   Monitor lab/diagnostic results   Monitor all insertion sites i.e., indwelling lines, tubes and drains   Administer medications as ordered   Instruct and encourage patient and family to use good hand hygiene technique     Problem: Safety - Adult  Goal: Free from fall injury  Outcome: Progressing  Flowsheets (Taken 2023 050)  Free From Fall Injury: Instruct family/caregiver on patient safety   Care plan reviewed with patient and FOB. Patient and FOB verbalize understanding of the plan of care and contribute to goal setting.

## 2023-07-20 NOTE — DISCHARGE INSTRUCTIONS
DISCHARGE INSTRUCTIONS FOR  PATIENTS AND FAMILY      Discharge Instructions for Labor and Delivery, Vaginal Birth     A vaginal birth refers to the baby being delivered through the vagina. The amount of time that labor can take varies greatly. Labor for the average first-born baby is about 16 hours. Usually your hospital stay after a routine delivery is no more than two nights. Some new mothers go home the same day. Recovery from childbirth varies depending upon whether you had an episiotomy (an incision in the perineum, the area between your vaginal opening and your anus), the duration of labor and delivery, and the amount of rest you get. In general, it takes about 6-8 weeks for a woman's body to recover from childbirth. What You Will Need   Along with your medications, you will need the following:   Sanitary pads    Nursing pads    Witch hazel pads    Possibly a [address] will want to arrange for transportation home for you and your baby. The baby will need a car seat. You will receive instructions in the hospital for breastfeeding and taking care of the perineum area. You may use ointment for cracked nipples or warm water rinses to your perineum. You will need to wear sanitary pads for about six weeks after delivery. If you had an episiotomy or vaginal tear, you will be sent home with a plastic squirt bottle. Fill it with warm water and squirt over the vaginal and anal area every time you urinate and defecate. Warm baths can be soothing to healing tissues. Apply warm or cold cloths to sore breasts. Apply ointment to cracked nipples. Use nursing pads for leaky breast.    Apply witch hazel pads to sore perineum (area between vagina and anus). Ask your doctor about when it is safe for you to shower or bathe. Sit in a sitz bath to soothe sore perineum and/or hemorrhoids. A sitz bath is soaking the hip and buttocks area in warm water.  You can buy a plastic sitz a sitz bath or soak in a clean tub as needed for comfort. Kegel exercises will help restore bladder control. SWELLING  Try to keep your legs elevated when you are sitting. When lying down keep your legs elevated. When wearing stocking or socks, make sure they are not too tight. WHEN TO CALL THE DOCTOR  If you have a temp of 100.4 or more. If your bleeding has increased and you are saturating a pad in an hour. Your abdomen is tender to touch. You are passing blood clots bigger than the size of a lemon. If you are experiencing extreme weakness or dizziness. If you are having flu-like symptoms such as achy muscles or joints. There is a foul smell or a green color to your vaginal bleeding. If you have pain that cannot be relieved with Tylenol or Motrin. You have persistent burning with urination or frequency. Call if you have concerns about your well-being. You are unable to sleep, eat, or are having thoughts of harming yourself or your baby. Call you OB provider if your depression score is greater than 10. You have swelling, bleeding, drainage, foul odor, redness, or warmth in/around your incision or stitches. You have a red, warm, tender area in you calf. Please contact your OB physician right way. Please refer to your \"Guide for New Mothers \" Cathy Bowman for  further information of caring for yourself & your baby. Follow-up with your [facility] doctor as specified. For Breastfeeding moms, you can contact our lactation specialists with  any problems or questions you may have. Contact our Lactation Consultants at [phone]. Please feel free to leave a message and they will return your call.

## 2023-07-21 VITALS
HEIGHT: 70 IN | OXYGEN SATURATION: 96 % | TEMPERATURE: 97.9 F | HEART RATE: 75 BPM | SYSTOLIC BLOOD PRESSURE: 135 MMHG | WEIGHT: 205 LBS | BODY MASS INDEX: 29.35 KG/M2 | DIASTOLIC BLOOD PRESSURE: 70 MMHG | RESPIRATION RATE: 16 BRPM

## 2023-07-21 PROCEDURE — 6370000000 HC RX 637 (ALT 250 FOR IP): Performed by: OBSTETRICS & GYNECOLOGY

## 2023-07-21 RX ADMIN — IBUPROFEN 800 MG: 800 TABLET ORAL at 04:25

## 2023-07-21 ASSESSMENT — PAIN DESCRIPTION - LOCATION: LOCATION: ABDOMEN

## 2023-07-21 ASSESSMENT — PAIN SCALES - GENERAL
PAINLEVEL_OUTOF10: 5
PAINLEVEL_OUTOF10: 0
PAINLEVEL_OUTOF10: 0

## 2023-07-21 ASSESSMENT — PAIN DESCRIPTION - DESCRIPTORS: DESCRIPTORS: CRAMPING

## 2023-07-21 ASSESSMENT — PAIN - FUNCTIONAL ASSESSMENT: PAIN_FUNCTIONAL_ASSESSMENT: ACTIVITIES ARE NOT PREVENTED

## 2023-07-21 NOTE — DISCHARGE SUMMARY
Obstetric Discharge Summary      Pt Name: Juanjose Duarte  MRN: [id number] [facility] #: [[id number]]  YOB: 1990        Admitting Diagnosis  IUP  OB History          4    Para   3    Term   3            AB   1    Living   3         SAB   1    IAB        Ectopic        Molar        Multiple   0    Live Births   3                Reasons for Admission on 2023  4:56 AM  Encounter for elective induction of labor [Z34.90]  No comment available  Vaginal Delivery      Intrapartum Procedures        Multiple birth?: No                 Postpartum/Operative Complications       [place] Data  Information for the patient's :  Yanet Cushing [[id number]]   male   Birth Weight: 8 lb 9.2 oz (3.89 kg)     Discharge Diagnosis       Discharge Information  Current Discharge Medication List        STOP taking these medications       MONO-LINYAH 0.25-35 MG-MCG per tablet Comments:   Reason for Stopping:               No discharge procedures on file. Vaginal Delivery  Diet regular  Condition Good    Discharge to:  home  Follow up in 5-6 wks.   Sharon Nicholson MD 2023 8:14 AM

## 2023-07-21 NOTE — LACTATION NOTE
Pt. Stated she has no questions or concerns for lactation at this time. Encouraged pt.  To call for assistance if needed

## 2023-07-21 NOTE — FLOWSHEET NOTE
Postpartum education brochure given, teaching complete. [place] postpartum depression screening discussed with patient. Patient instructed to complete [place] postpartum depression screening in 2 weeks and contact her healthcare provider if her score is > 10. Patient voiced understanding. Reviewed postpartum birth warning signs flyer with patient. Patient has voiced understanding of teaching. Discharge teaching and instructions for diagnosis/procedure of VAGINAL DELIVERY completed with patient using teachback method. AVS reviewed. voiced understanding regarding follow up appointments, and care of self at home. Discharged in a wheelchair to  home with support per family Mother's blood type is O+.   Patient declined offer of TDap  Mom and Baby are discharged via wheelchair in stable condition with FOB

## 2023-07-21 NOTE — PLAN OF CARE
Problem: Pain  Goal: Verbalizes/displays adequate comfort level or baseline comfort level  Recent Flowsheet Documentation  Taken 7/21/2023 0907 by Alvin Rosa RN  Verbalizes/displays adequate comfort level or baseline comfort level:   Encourage patient to monitor pain and request assistance   Administer analgesics based on type and severity of pain and evaluate response   Assess pain using appropriate pain scale   Implement non-pharmacological measures as appropriate and evaluate response  Taken 7/20/2023 2106 by Shahnaz Rodriguez RN  Verbalizes/displays adequate comfort level or baseline comfort level: Encourage patient to monitor pain and request assistance     Problem: Infection - Adult  Goal: Absence of infection at discharge  Recent Flowsheet Documentation  Taken 7/21/2023 0907 by Alvin Rosa RN  Absence of infection at discharge:   Assess and monitor for signs and symptoms of infection   Monitor lab/diagnostic results   Instruct and encourage patient and family to use good hand hygiene technique  Taken 7/20/2023 2106 by Shahnaz Rodriguez RN  Absence of infection at discharge:   Assess and monitor for signs and symptoms of infection   Monitor lab/diagnostic results     Problem: Safety - Adult  Goal: Free from fall injury  Recent Flowsheet Documentation  Taken 7/21/2023 0907 by Alvin Rosa RN  Free From Fall Injury: Instruct family/caregiver on patient safety  Taken 7/20/2023 2106 by Shahnaz Rodriguez RN  Free From Fall Injury: Instruct family/caregiver on patient safety     Problem: Postpartum  Goal: Experiences normal postpartum course  Description:  Postpartum OB-Pregnancy care plan goal which identifies if the mother is experiencing a normal postpartum course  7/21/2023 0907 by Alvin Rosa RN  Outcome: Progressing  Flowsheets (Taken 7/21/2023 0907)  Experiences Normal Postpartum Course:   Monitor maternal vital signs   Assess uterine involution     Problem: Postpartum  Goal: Appropriate family/caregiver on patient safety     Problem: Discharge Planning  Goal: Discharge to home or other facility with appropriate resources  7/21/2023 0907 by Daisy Amezquita RN  Outcome: Progressing  Flowsheets (Taken 7/21/2023 0907)  Discharge to home or other facility with appropriate resources: Identify barriers to discharge with patient and caregiver   Care plan reviewed with patient and verbalized understanding. Patient contributed to goal setting.

## 2023-07-21 NOTE — ANESTHESIA POSTPROCEDURE EVALUATION
Department of Anesthesiology  Postprocedure Note    Patient: Alba Espinosa  MRN: [id number]  YOB: 1990  Date of evaluation: 7/21/2023      Procedure Summary     Date: 07/20/23 Room / Location:     Anesthesia Start: 1102 Anesthesia Stop: 0684    Procedure: Labor Analgesia Diagnosis:     Scheduled Providers:  Responsible Provider: Sean Briseno DO    Anesthesia Type: epidural ASA Status: 2          Anesthesia Type: No value filed.     Mk Phase I: Mk Score: 9    Mk Phase II:        Anesthesia Post Evaluation    Patient location during evaluation: bedside  Patient participation: complete - patient participated  Level of consciousness: awake and alert  Airway patency: patent  Nausea & Vomiting: no nausea and no vomiting  Complications: no  Cardiovascular status: hemodynamically stable  Respiratory status: spontaneous ventilation, room air and nonlabored ventilation  Hydration status: stable

## 2023-07-21 NOTE — PLAN OF CARE
Problem: Postpartum  Goal: Experiences normal postpartum course  Description:  Postpartum OB-Pregnancy care plan goal which identifies if the mother is experiencing a normal postpartum course  2023 by Luz Eller RN  Outcome: Progressing  Flowsheets (Taken 2023)  Experiences Normal Postpartum Course:   Monitor maternal vital signs   Assess uterine involution     Problem: Postpartum  Goal: Appropriate maternal -  bonding  Description:  Postpartum OB-Pregnancy care plan goal which identifies if the mother and  are bonding appropriately  2023 by Luz Eller RN  Outcome: Progressing  Note: Bonding with baby, participating in infant care.        Problem: Postpartum  Goal: Establishment of infant feeding pattern  Description:  Postpartum OB-Pregnancy care plan goal which identifies if the mother is establishing a feeding pattern with their   2023 by Luz Eller RN  Outcome: Progressing  Flowsheets (Taken 2023)  Establishment of Infant Feeding Pattern: Assess breast/bottle feeding     Problem: Postpartum  Goal: Incisions, wounds, or drain sites healing without S/S of infection  2023 by Luz Eller RN  Outcome: Progressing  Flowsheets (Taken 2023)  Incisions, Wounds, or Drain Sites Healing Without Sign and Symptoms of Infection: TWICE DAILY: Assess and document skin integrity     Problem: Pain  Goal: Verbalizes/displays adequate comfort level or baseline comfort level  2023 by Luz Eller RN  Outcome: Progressing  Flowsheets (Taken 2023)  Verbalizes/displays adequate comfort level or baseline comfort level: Encourage patient to monitor pain and request assistance     Problem: Infection - Adult  Goal: Absence of infection at discharge  2023 by Luz Eller RN  Outcome: Progressing  Flowsheets (Taken 2023)  Absence of infection at discharge:   Assess and monitor for signs

## 2024-08-10 ENCOUNTER — HOSPITAL ENCOUNTER (EMERGENCY)
Age: 34
Discharge: HOME | End: 2024-08-10
Payer: COMMERCIAL

## 2024-08-10 VITALS
OXYGEN SATURATION: 98 % | HEART RATE: 70 BPM | RESPIRATION RATE: 16 BRPM | DIASTOLIC BLOOD PRESSURE: 85 MMHG | SYSTOLIC BLOOD PRESSURE: 142 MMHG | TEMPERATURE: 98.7 F

## 2024-08-10 DIAGNOSIS — K08.89 PAIN, DENTAL: ICD-10-CM

## 2024-08-10 DIAGNOSIS — K04.7 DENTAL ABSCESS: Primary | ICD-10-CM

## 2024-08-10 PROCEDURE — 99213 OFFICE O/P EST LOW 20 MIN: CPT

## 2024-08-10 RX ORDER — NORGESTIMATE AND ETHINYL ESTRADIOL 0.25-0.035
1 KIT ORAL DAILY
COMMUNITY

## 2024-08-10 RX ORDER — IBUPROFEN 200 MG
200 TABLET ORAL EVERY 6 HOURS PRN
COMMUNITY

## 2024-08-10 RX ORDER — AMOXICILLIN 500 MG/1
500 CAPSULE ORAL 2 TIMES DAILY
Qty: 20 CAPSULE | Refills: 0 | Status: SHIPPED | OUTPATIENT
Start: 2024-08-10 | End: 2024-08-20

## 2024-08-10 RX ORDER — CLINDAMYCIN HYDROCHLORIDE 150 MG/1
100 CAPSULE ORAL 3 TIMES DAILY
COMMUNITY

## 2024-08-10 RX ORDER — ACETAMINOPHEN 500 MG
500 TABLET ORAL EVERY 6 HOURS PRN
COMMUNITY

## 2024-08-10 RX ORDER — TRAMADOL HYDROCHLORIDE 50 MG/1
50 TABLET ORAL EVERY 6 HOURS PRN
Qty: 12 TABLET | Refills: 0 | Status: SHIPPED | OUTPATIENT
Start: 2024-08-10 | End: 2024-08-13

## 2024-08-10 ASSESSMENT — ENCOUNTER SYMPTOMS
EYE DISCHARGE: 0
SHORTNESS OF BREATH: 0
SORE THROAT: 0
EYE REDNESS: 0
TROUBLE SWALLOWING: 0
COUGH: 0
NAUSEA: 0
RHINORRHEA: 0
VOMITING: 0
DIARRHEA: 0

## 2024-08-10 ASSESSMENT — PAIN DESCRIPTION - FREQUENCY: FREQUENCY: CONTINUOUS

## 2024-08-10 ASSESSMENT — PAIN DESCRIPTION - LOCATION: LOCATION: TEETH

## 2024-08-10 ASSESSMENT — PAIN DESCRIPTION - ORIENTATION: ORIENTATION: RIGHT;LOWER

## 2024-08-10 ASSESSMENT — PAIN - FUNCTIONAL ASSESSMENT: PAIN_FUNCTIONAL_ASSESSMENT: 0-10

## 2024-08-10 ASSESSMENT — PAIN DESCRIPTION - DESCRIPTORS: DESCRIPTORS: THROBBING

## 2024-08-10 ASSESSMENT — PAIN DESCRIPTION - PAIN TYPE: TYPE: ACUTE PAIN

## 2024-08-10 NOTE — ED PROVIDER NOTES
[facility]  Urgent Care Encounter      CHIEF COMPLAINT       Chief Complaint   Patient presents with    Dental Pain     Right lower tooth abscess onset Tuesday with pain had an xray yesterday at her dentist        Nurses Notes reviewed and I agree except as noted in the HPI.  HISTORY OF PRESENT ILLNESS   Magaly Subramanian is a 33 y.o. female who presents urgent care for evaluation of dental pain.  Patient presents with her mother for evaluation.  Onset of symptoms on Tuesday.  She reports she was seen yesterday at her dentist office and was told that she has a right lower dental abscess.  She reports she was given a oral antibiotic clindamycin.  She reports that she has been taking Tylenol and Advil back to back without any relief in pain.  Icing face.  Mother reports she is just laying there weeping due to how much pain she is in.  Patient reports her pain is 10 out of 10.    REVIEW OF SYSTEMS     Review of Systems   Constitutional:  Negative for chills, diaphoresis, fatigue and fever.   HENT:  Positive for dental problem. Negative for congestion, ear pain, rhinorrhea, sore throat and trouble swallowing.    Eyes:  Negative for discharge and redness.   Respiratory:  Negative for cough and shortness of breath.    Cardiovascular:  Negative for chest pain.   Gastrointestinal:  Negative for diarrhea, nausea and vomiting.   Genitourinary:  Negative for decreased urine volume.   Musculoskeletal:  Negative for neck pain and neck stiffness.   Skin:  Negative for rash.   Neurological:  Negative for headaches.   Hematological:  Negative for adenopathy.   Psychiatric/Behavioral:  Negative for sleep disturbance.        PAST MEDICAL HISTORY         Diagnosis Date    OCD (obsessive compulsive disorder)     PONV (postoperative nausea and vomiting)        SURGICAL HISTORY     Patient  has a past surgical history that includes Tonsillectomy; Adenoidectomy; [place] tooth extraction (Bilateral, 2013); pr induced  Your opinion matters!  Thank you for choosing the [facility]. You might receive a survey in the mail about your experience today to evaluate our clinic.  Please fill it out and return it in the pre-paid envelope.  It was a pleasure to care for you today!     Test Results:  Our goal is to report all test results ordered by our care provider within 7-14 days.  If you do not receive your test results either by phone or by mail within 14 days after your visit with our office please call our office at [phone] and ask to speak with a member of our care team.

## 2024-08-10 NOTE — ED TRIAGE NOTES
Patient walked to room 7 for left lower dental pain, swelling onset Tuesday. Patient had an xray done yesterday and has an abscess tooth and started an antibiotic yesterday.

## 2024-08-10 NOTE — DISCHARGE INSTRUCTIONS
Stop clindamycin.    prescribed amoxicillin 500 mg twice daily for 10 days.  Take this medication until completed.    Continue taking Tylenol and ibuprofen for pain control.  For pain not controlled with Tylenol ibuprofen prescription for tramadol 50 mg 1 tablet every 6 hours as needed.  This medication is a controlled substance please keep it in a safe place.    May continue icing.    Eat soft foods that do not require any chewing such as putting, applesauce, protein shakes.    Follow-up with your dentist or primary care provider in 3 to 5 days if symptoms worsen or fail to improve.

## (undated) DEVICE — GLOVE ORANGE PI 8   MSG9080

## (undated) DEVICE — SUTURE STRATAFIX SYMMETRIC SZ 1 L18IN ABSRB VLT CT1 L36CM SXPP1A404

## (undated) DEVICE — SOLUTION ANTIFOG VIS SYS CLEARIFY LAPSCP

## (undated) DEVICE — SET COLL TBNG L6FT DIA3/8IN W/ INTEGR SWVL HNDL SLIP RNG M

## (undated) DEVICE — TIP COVER ACCESSORY

## (undated) DEVICE — SOLUTION IV 1000ML 0.9% SOD CHL PH 5 INJ USP VIAFLX PLAS

## (undated) DEVICE — SYSTEM COLL W/ TISS TRAP INCLUDE COLL CANSTR LID SET OF

## (undated) DEVICE — 35 ML SYRINGE LUER-LOCK TIP: Brand: MONOJECT

## (undated) DEVICE — PAD,NON-ADHERENT,3X8,STERILE,LF,1/PK: Brand: MEDLINE

## (undated) DEVICE — GLOVE ORANGE PI 7   MSG9070

## (undated) DEVICE — COLUMN DRAPE

## (undated) DEVICE — BLADELESS OBTURATOR: Brand: WECK VISTA

## (undated) DEVICE — INSUFFLATION NEEDLE TO ESTABLISH PNEUMOPERITONEUM.: Brand: INSUFFLATION NEEDLE

## (undated) DEVICE — GENERAL LAPAROSCOPY PACK-LF: Brand: MEDLINE INDUSTRIES, INC.

## (undated) DEVICE — REDUCER: Brand: ENDOWRIST

## (undated) DEVICE — BASIC SINGLE BASIN BTC-LF: Brand: MEDLINE INDUSTRIES, INC.

## (undated) DEVICE — GOWN,SIRUS,NONRNF,SETINSLV,XL,20/CS: Brand: MEDLINE

## (undated) DEVICE — CANNULA SEAL

## (undated) DEVICE — TUBING INSUFFLATOR HEAT HUMIDIFIED SMK EVAC SET PNEUMOCLEAR

## (undated) DEVICE — TRAP TISS DISP FOR COLL SYS BERK SAFETOUCH

## (undated) DEVICE — SUTURE STRATAFIX SPRL SZ 1 L9IN ABSRB VLT CT 1 L36MM 1 2 CIR SXPD2B402

## (undated) DEVICE — ARM DRAPE

## (undated) DEVICE — PAD,SANITARY,11 IN,MAXI,W/WINGS,N-STRL: Brand: MEDLINE

## (undated) DEVICE — ELECTRO LUBE IS A SINGLE PATIENT USE DEVICE THAT IS INTENDED TO BE USED ON ELECTROSURGICAL ELECTRODES TO REDUCE STICKING.: Brand: KEY SURGICAL ELECTRO LUBE

## (undated) DEVICE — Z DISCONTINUED BY MEDLINE USE 2711682 TRAY SKIN PREP DRY W/ PREM GLV

## (undated) DEVICE — PACK,SET UP,NO DRAPES: Brand: MEDLINE

## (undated) DEVICE — GLOVE SURG SZ 6 THK91MIL LTX FREE SYN POLYISOPRENE ANTI

## (undated) DEVICE — GOWN,SIRUS,NON REINFRCD,LARGE,SET IN SL: Brand: MEDLINE

## (undated) DEVICE — TOWEL,OR,DSP,ST,BLUE,DLX,4/PK,20PK/CS: Brand: MEDLINE

## (undated) DEVICE — SUTURE VCRL + SZ 0 L27IN ABSRB VLT L26MM UR-6 5/8 CIR VCP603H

## (undated) DEVICE — GAUZE,SPONGE,8"X4",12PLY,XRAY,STRL,LF: Brand: MEDLINE

## (undated) DEVICE — DRAPE,UNDERBUTTOCKS,PCH,STERILE: Brand: MEDLINE

## (undated) DEVICE — SEAL

## (undated) DEVICE — SURE SET SINGLE BASIN-LF: Brand: MEDLINE INDUSTRIES, INC.

## (undated) DEVICE — BANDAGE ADH W1XL3IN NAT FAB WVN FLX DURABLE N ADH PD SEAL

## (undated) DEVICE — BINDER ABD SM M W12IN CIRC 30 45IN 4 PNL E CNTCT CLSR POSTOP